# Patient Record
Sex: FEMALE | Race: WHITE
[De-identification: names, ages, dates, MRNs, and addresses within clinical notes are randomized per-mention and may not be internally consistent; named-entity substitution may affect disease eponyms.]

---

## 2017-02-16 ENCOUNTER — HOSPITAL ENCOUNTER (OUTPATIENT)
Dept: HOSPITAL 53 - M RAD | Age: 72
End: 2017-02-16
Attending: NURSE PRACTITIONER
Payer: MEDICARE

## 2017-02-16 DIAGNOSIS — R63.4: ICD-10-CM

## 2017-02-16 DIAGNOSIS — J98.8: Primary | ICD-10-CM

## 2017-02-16 PROCEDURE — 74178 CT ABD&PLV WO CNTR FLWD CNTR: CPT

## 2017-02-16 NOTE — REP
Clinical:  Weight loss.

 

Technique:  Axial contrast enhanced images from the lung bases to the pubic

symphysis using oral and 100 ml Isovue 370 intravenous contrast material with

precontrast and delayed images of the abdomen as well as coronal and sagittal

re-formations.

 

Comparison:  None.

 

Findings:

Lung bases demonstrate diffuse fibrosis and honeycombing without acute

consolidation or obvious nodule/mass.  Visualized heart and pericardium appear

normal.  Small hiatal hernia noted at the gastroesophageal junction.

 

Liver, spleen, pancreas, bilateral adrenal glands and kidneys are relatively

normal.  Few bilateral simple renal cysts measure up to 1.5 cm.  The patient is

status post cholecystectomy.  The enteric system is without obstruction or

obvious acute inflammatory process.  Pelvis demonstrates normal bladder and

age-appropriate uterus/adnexa.  No pelvic fluid or ascites.  No obvious

intraperitoneal or retroperitoneal adenopathy.  No free air.  Atherosclerotic

changes to the thoracic aorta and vasculature noted without aneurysm.

Musculoskeletal structures demonstrate degenerative changes without focal osseous

abnormality.

 

Impression:

Chronic nonacute changes as described above.

No obvious acute intra-abdominal or pelvic pathology appreciated.

Lung bases demonstrate fibrosis and honeycombing.

 

 

Signed by

Johnson Cooper MD 02/16/2017 11:31 A

## 2017-04-05 ENCOUNTER — HOSPITAL ENCOUNTER (OUTPATIENT)
Dept: HOSPITAL 53 - M LAB REF | Age: 72
End: 2017-04-05
Attending: INTERNAL MEDICINE
Payer: MEDICARE

## 2017-04-05 DIAGNOSIS — J84.10: Primary | ICD-10-CM

## 2017-04-11 LAB
ENA SS-A AB SER-ACNC: <0.2 AI (ref 0–0.9)
ENA SS-B AB SER-ACNC: <0.2 AI (ref 0–0.9)

## 2017-04-25 ENCOUNTER — HOSPITAL ENCOUNTER (OUTPATIENT)
Dept: HOSPITAL 53 - M WHC | Age: 72
End: 2017-04-25
Attending: NURSE PRACTITIONER
Payer: MEDICARE

## 2017-04-25 DIAGNOSIS — N95.0: ICD-10-CM

## 2017-04-25 DIAGNOSIS — N84.0: Primary | ICD-10-CM

## 2017-04-25 NOTE — REP
PELVIC ULTRASOUND:

 

Real-time sonographic evaluation of the pelvis is performed utilizing

transabdominal and endovaginal technique.

 

The bladder measures 4.3 x 2.8 x 7.2 cm. The uterus measures 7.5 x 3.0 x 4.3 cm.

Oval heterogenous polyp or mass in the endometrial cavity measures 1.4 x 0.9 x

1.9 cm.  There is a tiny amount of surrounding fluid.  The right ovary could not

be visualized, nor could the left ovary.  I see no adnexal mass or free fluid.

There is a fibroid posteriorly in the uterus measuring 1.6 cm in diameter.

 

IMPRESSION:

 

Oval mass or polyp in the endometrial cavity measuring 1.4 x 0.6 x 1.9 cm.  Tiny

amount of surrounding fluid is seen.

## 2017-04-27 ENCOUNTER — HOSPITAL ENCOUNTER (OUTPATIENT)
Dept: HOSPITAL 53 - M CARPUL | Age: 72
End: 2017-04-27
Attending: INTERNAL MEDICINE
Payer: MEDICARE

## 2017-04-27 ENCOUNTER — HOSPITAL ENCOUNTER (OUTPATIENT)
Dept: HOSPITAL 53 - M LAB | Age: 72
End: 2017-04-27
Attending: INTERNAL MEDICINE
Payer: MEDICARE

## 2017-04-27 DIAGNOSIS — J84.10: Primary | ICD-10-CM

## 2017-04-27 DIAGNOSIS — I51.9: ICD-10-CM

## 2017-04-27 LAB
BUN SERPL-MCNC: 13 MG/DL (ref 7–18)
CREAT SERPL-MCNC: 0.65 MG/DL (ref 0.55–1.02)
GFR SERPL CREATININE-BSD FRML MDRD: > 60 ML/MIN/{1.73_M2} (ref 39–?)

## 2017-04-27 NOTE — ECHO
DATE OF PROCEDURE: 04/27/2017

 

REFERRING PHYSICIAN: Scott Modi MD

 

INDICATION: Shortness of breath, scleroderma and pulmonary hypertension.

 

PATIENT LOCATION: Outpatient

 

HEIGHT: 160 cm

WEIGHT: 51 kg

 

DIMENSIONS:

IVS: 1.1

LV: 3.2

LVPW: 1,1

LA: 2.7

Aorta: 3.1

 

FINDINGS: Study is of acceptable technical quality.  Left ventricle is normal

size and grossly normal systolic function, estimated ejection fraction (EF)

around 50-55%.  Right ventricle does not appear enlarged.  Both atria appear

normal (left atrial volume index is 15.1 mL per meter squared). All four cardiac

valves were reasonably well seen and appear normal.  No pericardial effusion is

noted.  Inferior vena cava is normal size. Aortic root is normal. Aortic arch 
and

abdominal aorta were not well seen.

 

Doppler interrogation reveals no aortic stenosis or insufficiency.  There is

trace mitral and trace tricuspid insufficiency.  Quality of tricuspid

regurgitation (TR) jet was not sufficiently accurate to estimate pulmonary 
artery

pressure.  Pulmonic valve is functionally competent.

 

Evaluation of diastolic function reveals grade 1 diastolic dysfunction (E

velocity on mitral valve inflow is 55.3 cm/s, E prime velocity septal 5.9 and

lateral 5.5 cm/s respectively).

 

CONCLUSIONS:

 

1.  Study is of acceptable technical quality.

2.  Low normal left ventricle (LV) size and systolic function, grade 1 diastolic

dysfunction.

3.  No significant valvular disease.

4.  Normal central venous pressure.

5.  Unable to reliably estimate pulmonary artery pressure.  No indirect signs to

suggest pulmonary hypertension.

 

COMMENTS:

Subacute bacterial endocarditis (SBE) prophylaxis is not recommended.

[facility]

## 2017-05-03 ENCOUNTER — HOSPITAL ENCOUNTER (OUTPATIENT)
Dept: HOSPITAL 53 - M RAD | Age: 72
End: 2017-05-03
Attending: INTERNAL MEDICINE
Payer: MEDICARE

## 2017-05-03 DIAGNOSIS — J84.10: Primary | ICD-10-CM

## 2017-05-03 DIAGNOSIS — R91.8: ICD-10-CM

## 2017-05-03 PROCEDURE — 71260 CT THORAX DX C+: CPT

## 2017-05-03 NOTE — REP
REASON:  Pulmonary fibrosis.

 

COMPARISON:  10/25/2016, a CT angio chest.

 

CONTRAST UTILIZED:  100 mL Isovue-370.

 

There is bilateral hilar adenopathy representing a change from the prior exam.

There are no pleural or pericardial effusions. There is no change in appearance

of the imaged upper abdomen or imaged osseous structures.

 

Evaluation of the lung fields shows marked emphysematous change with honeycomb

lung and scattered asymmetric septal opacities. This is particular to the lung

bases, but is rather widespread and seen throughout the apical regions as well.

The appearance of this has worsened from the prior exam. The asymmetric opacities

could easily obscure a significant lung nodule.

 

IMPRESSION:

 

Advanced fibrotic changes as described above with newly developed adenopathy.

 

 

Signed by

Ba Umanzor DO 05/03/2017 04:14 P

## 2017-06-07 ENCOUNTER — HOSPITAL ENCOUNTER (OUTPATIENT)
Dept: HOSPITAL 53 - M LAB | Age: 72
End: 2017-06-07
Attending: INTERNAL MEDICINE
Payer: MEDICARE

## 2017-06-07 ENCOUNTER — HOSPITAL ENCOUNTER (OUTPATIENT)
Dept: HOSPITAL 53 - M LAB | Age: 72
End: 2017-06-07
Attending: ANESTHESIOLOGY
Payer: MEDICARE

## 2017-06-07 DIAGNOSIS — F33.9: ICD-10-CM

## 2017-06-07 DIAGNOSIS — I10: ICD-10-CM

## 2017-06-07 DIAGNOSIS — F41.9: ICD-10-CM

## 2017-06-07 DIAGNOSIS — R94.31: ICD-10-CM

## 2017-06-07 DIAGNOSIS — Z01.818: Primary | ICD-10-CM

## 2017-06-07 DIAGNOSIS — J84.10: Primary | ICD-10-CM

## 2017-06-07 LAB
ANION GAP SERPL CALC-SCNC: 8 MEQ/L (ref 8–16)
BUN SERPL-MCNC: 14 MG/DL (ref 7–18)
CALCIUM SERPL-MCNC: 9.6 MG/DL (ref 8.8–10.2)
CHLORIDE SERPL-SCNC: 104 MEQ/L (ref 98–107)
CO2 SERPL-SCNC: 26 MEQ/L (ref 21–32)
CREAT SERPL-MCNC: 0.58 MG/DL (ref 0.55–1.02)
GFR SERPL CREATININE-BSD FRML MDRD: > 60 ML/MIN/{1.73_M2} (ref 39–?)
GLUCOSE SERPL-MCNC: 101 MG/DL (ref 83–110)
POTASSIUM SERPL-SCNC: 4 MEQ/L (ref 3.5–5.1)
SODIUM SERPL-SCNC: 138 MEQ/L (ref 136–145)

## 2017-06-07 NOTE — ECGEPIP
Stationary ECG Study

                              [facility]

                                       

                                       Test Date:    2017

Pat Name:     GONZALO SALAS           Department:   

Patient ID:   [id number]                 Room:         -

Gender:       F                        Technician:   LEI

:          1945               Requested By: Timi MCCLURE

Order Number: [id number]     Reading MD:   Matthias Harmon

                                 Measurements

Intervals                              Axis          

Rate:         88                       P:            80

NC:           160                      QRS:          88

QRSD:         94                       T:            40

QT:           357                                    

QTc:          432                                    

                           Interpretive Statements

SINUS RHYTHM

Delayed anterior R wave progression

Comparison tracing not on file

Electronically Signed On 2017 21:11:24 EDT by Matthias Harmon

## 2017-06-12 ENCOUNTER — HOSPITAL ENCOUNTER (OUTPATIENT)
Dept: HOSPITAL 53 - M SDC | Age: 72
Discharge: HOME | End: 2017-06-12
Attending: OBSTETRICS & GYNECOLOGY
Payer: MEDICARE

## 2017-06-12 VITALS — HEIGHT: 63 IN | WEIGHT: 111 LBS | BODY MASS INDEX: 19.67 KG/M2

## 2017-06-12 VITALS — SYSTOLIC BLOOD PRESSURE: 149 MMHG | DIASTOLIC BLOOD PRESSURE: 79 MMHG

## 2017-06-12 DIAGNOSIS — I10: ICD-10-CM

## 2017-06-12 DIAGNOSIS — F41.9: ICD-10-CM

## 2017-06-12 DIAGNOSIS — F32.9: ICD-10-CM

## 2017-06-12 DIAGNOSIS — K21.9: ICD-10-CM

## 2017-06-12 DIAGNOSIS — N95.0: Primary | ICD-10-CM

## 2017-06-12 DIAGNOSIS — D25.9: ICD-10-CM

## 2017-06-12 DIAGNOSIS — R06.02: ICD-10-CM

## 2017-06-12 DIAGNOSIS — Z78.0: ICD-10-CM

## 2017-06-12 DIAGNOSIS — G43.909: ICD-10-CM

## 2017-06-12 DIAGNOSIS — Z86.79: ICD-10-CM

## 2017-06-12 DIAGNOSIS — Z87.39: ICD-10-CM

## 2017-06-12 DIAGNOSIS — Z87.891: ICD-10-CM

## 2017-06-12 LAB
ERYTHROCYTE [DISTWIDTH] IN BLOOD BY AUTOMATED COUNT: 13.8 % (ref 11.5–14.5)
MCH RBC QN AUTO: 29.2 PG (ref 27–33)
MCHC RBC AUTO-ENTMCNC: 32.5 G/DL (ref 32–36.5)
MCV RBC AUTO: 90 FL (ref 80–96)
PLATELET # BLD AUTO: 507 K/MM3 (ref 150–450)
WBC # BLD AUTO: 11.3 K/MM3 (ref 4–10)

## 2017-06-12 PROCEDURE — 86901 BLOOD TYPING SEROLOGIC RH(D): CPT

## 2017-06-12 PROCEDURE — 86900 BLOOD TYPING SEROLOGIC ABO: CPT

## 2017-06-12 PROCEDURE — 58558 HYSTEROSCOPY BIOPSY: CPT

## 2017-06-12 PROCEDURE — 85027 COMPLETE CBC AUTOMATED: CPT

## 2017-06-12 PROCEDURE — 36415 COLL VENOUS BLD VENIPUNCTURE: CPT

## 2017-06-12 PROCEDURE — 86850 RBC ANTIBODY SCREEN: CPT

## 2017-06-12 PROCEDURE — 88305 TISSUE EXAM BY PATHOLOGIST: CPT

## 2017-06-13 NOTE — RO
DATE OF PROCEDURE:  06/12/2017

 

PREOPERATIVE DIAGNOSES:

1.  Postmenopausal bleeding.

2.  Intrauterine mass.

 

POSTOPERATIVE DIAGNOSES:

1.  Postmenopausal bleeding.

2.  Intrauterine mass.

 

PROCEDURE PERFORMED: Hysteroscopic MyoSure/removal of intrauterine mass.

 

SURGEON:  Dr. Duane Santamaria.

 

ASSISTANT:  None.

 

ANESTHESIA: General.

 

SPECIMENS TO PATHOLOGY: Intrauterine mass fragments, removed via MyoSure.

 

ESTIMATED BLOOD LOSS:  5 mL.

 

FLUIDS REPLACED:  1 liters lactated ringers.

 

DRAINS:  In and out catheter, 50 mL urine output.

 

FLUID DEFICIT:  800 mL normal saline.

 

COMPLICATIONS: Possible fundal uterine perforation.

 

FINDINGS: Fundal intrauterine mass measuring approximately 1.5 cm in greatest

dimension, pale, white, smooth appearing and at the level of the fundus.  There

was also concern for possible fundal uterine perforation at the end of the

procedure.  See operative dictation for further details of procedure.

 

INDICATION: The patient is a 72-year-old with postmenopausal bleeding.  Pelvic

ultrasound revealed evidence of possible intrauterine mass measuring 1.9 cm in

greatest dimension.

 

PROCEDURE: The patient was counseled, consented on the risks, benefits,

indications and alternatives of the procedure.  Informed consent was obtained.

She was taken to the operating room with an IV running and placed on the

operating table in the dorsal supine position.  General anesthesia was

administered and the airway secured without any difficulty.  She was then placed

in low lithotomy position.  Time-out was performed per protocol.  She was

prepared and draped in normal sterile fashion.  The bladder was drained with an

in-and-out catheter.  Sterile speculum was placed with good visualization of the

cervix.  The anterior lip of the cervix was grasped with single-tooth tenaculum

and downward traction was applied.  The uterus sounded to approximately 6.5 to 7

cm.  The cervix was sequentially dilated with Benjamin dilators up to #15.  The

MyoSure hysteroscope was placed transcervically into the intrauterine cavity.

The fundal mass was easily visualized.  The MyoSure device was placed right up

against this mass and the MyoSure device was activated.  The tissue fragments

were collected in the specimen bag.  The MyoSure device was removed.  After the

MyoSure device was removed, an additional hysteroscope evaluation was performed.

At this point, it was noted that there was difficulty maintaining uterine

distension.  There was possible small/pinpoint fundal uterine perforation that 
was both

visualized and palpated with the uterine sound.  Given this finding but lack of

any significant bleeding, the decision was made to conclude the procedure.  All

the devices were removed from the uterus.  The uterus was inspected at the

cervical os and minimal to no bleeding was noted.  There was only a small 
amount of

drainage of clear fluid/saline from the hysteroscopic procedure.  Additional 

time was spent observing the amount bleeding from the cervical os.  Minimal to 
no

bleeding from the cervical os was noted even with fundal pressure.  The abdomen

was nondistended.  The fluid deficit at this point was 800 mL.  The decision was

made to conclude the procedure.  All instruments were removed from the vagina.

The sponge and instrument counts were correct.  The patient was hemodynamically

stable throughout the entire procedure.  She was transferred to the

postanesthesia care unit  in good and stable condition.  Because of possible

uterine perforation, I will be administering Ancef 1 gram IV in the PACU.  The

patient will be continued to be observed closely during the immediate

postoperative recovery.

JOSSELIN

## 2017-06-14 LAB — A1AT SERPL-MCNC: 204 MG/DL (ref 90–200)

## 2017-08-29 ENCOUNTER — HOSPITAL ENCOUNTER (INPATIENT)
Dept: HOSPITAL 53 - M ED | Age: 72
LOS: 6 days | Discharge: HOME | DRG: 699 | End: 2017-09-04
Attending: HOSPITALIST | Admitting: HOSPITALIST
Payer: MEDICARE

## 2017-08-29 VITALS — BODY MASS INDEX: 18.44 KG/M2 | WEIGHT: 104.06 LBS | HEIGHT: 63 IN

## 2017-08-29 VITALS — DIASTOLIC BLOOD PRESSURE: 68 MMHG | SYSTOLIC BLOOD PRESSURE: 180 MMHG

## 2017-08-29 DIAGNOSIS — Z79.899: ICD-10-CM

## 2017-08-29 DIAGNOSIS — E87.6: ICD-10-CM

## 2017-08-29 DIAGNOSIS — I10: ICD-10-CM

## 2017-08-29 DIAGNOSIS — Z90.49: ICD-10-CM

## 2017-08-29 DIAGNOSIS — I16.0: ICD-10-CM

## 2017-08-29 DIAGNOSIS — N17.9: ICD-10-CM

## 2017-08-29 DIAGNOSIS — Z87.891: ICD-10-CM

## 2017-08-29 DIAGNOSIS — N28.0: Primary | ICD-10-CM

## 2017-08-29 DIAGNOSIS — M34.81: ICD-10-CM

## 2017-08-29 DIAGNOSIS — Z79.52: ICD-10-CM

## 2017-08-29 DIAGNOSIS — G43.909: ICD-10-CM

## 2017-08-29 DIAGNOSIS — J44.9: ICD-10-CM

## 2017-08-29 DIAGNOSIS — E78.5: ICD-10-CM

## 2017-08-29 DIAGNOSIS — F41.9: ICD-10-CM

## 2017-08-29 LAB
ANION GAP SERPL CALC-SCNC: 11 MEQ/L (ref 8–16)
ANION GAP SERPL CALC-SCNC: 12 MEQ/L (ref 8–16)
BASOPHILS # BLD AUTO: 0 K/MM3 (ref 0–0.2)
BASOPHILS NFR BLD AUTO: 0.1 % (ref 0–1)
BUN SERPL-MCNC: 40 MG/DL (ref 7–18)
BUN SERPL-MCNC: 44 MG/DL (ref 7–18)
CALCIUM SERPL-MCNC: 8.2 MG/DL (ref 8.8–10.2)
CALCIUM SERPL-MCNC: 8.7 MG/DL (ref 8.8–10.2)
CHLORIDE SERPL-SCNC: 105 MEQ/L (ref 98–107)
CHLORIDE SERPL-SCNC: 109 MEQ/L (ref 98–107)
CO2 SERPL-SCNC: 23 MEQ/L (ref 21–32)
CO2 SERPL-SCNC: 24 MEQ/L (ref 21–32)
CREAT SERPL-MCNC: 1.75 MG/DL (ref 0.55–1.02)
CREAT SERPL-MCNC: 1.88 MG/DL (ref 0.55–1.02)
EOSINOPHIL # BLD AUTO: 0.1 K/MM3 (ref 0–0.5)
EOSINOPHIL NFR BLD AUTO: 0.4 % (ref 0–3)
ERYTHROCYTE [DISTWIDTH] IN BLOOD BY AUTOMATED COUNT: 16.4 % (ref 11.5–14.5)
GFR SERPL CREATININE-BSD FRML MDRD: 27.9 ML/MIN/{1.73_M2} (ref 39–?)
GFR SERPL CREATININE-BSD FRML MDRD: 30.3 ML/MIN/{1.73_M2} (ref 39–?)
GLUCOSE SERPL-MCNC: 101 MG/DL (ref 83–110)
GLUCOSE SERPL-MCNC: 107 MG/DL (ref 83–110)
LARGE UNSTAINED CELL #: 0.1 K/MM3 (ref 0–0.4)
LARGE UNSTAINED CELL %: 0.5 % (ref 0–4)
LYMPHOCYTES # BLD AUTO: 0.8 K/MM3 (ref 1.5–4.5)
LYMPHOCYTES NFR BLD AUTO: 4.4 % (ref 24–44)
MAGNESIUM SERPL-MCNC: 2 MG/DL (ref 1.8–2.4)
MCH RBC QN AUTO: 29.7 PG (ref 27–33)
MCHC RBC AUTO-ENTMCNC: 33.1 G/DL (ref 32–36.5)
MCV RBC AUTO: 89.7 FL (ref 80–96)
MONOCYTES # BLD AUTO: 0.7 K/MM3 (ref 0–0.8)
MONOCYTES NFR BLD AUTO: 4.3 % (ref 0–5)
NEUTROPHILS # BLD AUTO: 14.7 K/MM3 (ref 1.8–7.7)
NEUTROPHILS NFR BLD AUTO: 90.2 % (ref 36–66)
OSMOLALITY UR: 343 MOSM/KG (ref 500–800)
PLATELET # BLD AUTO: 176 K/MM3 (ref 150–450)
POTASSIUM SERPL-SCNC: 3.5 MEQ/L (ref 3.5–5.1)
POTASSIUM SERPL-SCNC: 3.5 MEQ/L (ref 3.5–5.1)
SODIUM SERPL-SCNC: 141 MEQ/L (ref 136–145)
SODIUM SERPL-SCNC: 143 MEQ/L (ref 136–145)
WBC # BLD AUTO: 16.3 K/MM3 (ref 4–10)

## 2017-08-29 RX ADMIN — SODIUM CHLORIDE SCH UNITS: 4.5 INJECTION, SOLUTION INTRAVENOUS at 23:31

## 2017-08-30 VITALS — DIASTOLIC BLOOD PRESSURE: 80 MMHG | SYSTOLIC BLOOD PRESSURE: 144 MMHG

## 2017-08-30 VITALS — SYSTOLIC BLOOD PRESSURE: 164 MMHG | DIASTOLIC BLOOD PRESSURE: 72 MMHG

## 2017-08-30 VITALS — SYSTOLIC BLOOD PRESSURE: 134 MMHG | DIASTOLIC BLOOD PRESSURE: 62 MMHG

## 2017-08-30 VITALS — SYSTOLIC BLOOD PRESSURE: 192 MMHG | DIASTOLIC BLOOD PRESSURE: 94 MMHG

## 2017-08-30 VITALS — SYSTOLIC BLOOD PRESSURE: 150 MMHG | DIASTOLIC BLOOD PRESSURE: 70 MMHG

## 2017-08-30 LAB
ALBUMIN SERPL BCG-MCNC: 2.9 GM/DL (ref 3.2–5.2)
ANION GAP SERPL CALC-SCNC: 13 MEQ/L (ref 8–16)
BUN SERPL-MCNC: 40 MG/DL (ref 7–18)
CALCIUM SERPL-MCNC: 8.6 MG/DL (ref 8.8–10.2)
CHLORIDE SERPL-SCNC: 111 MEQ/L (ref 98–107)
CO2 SERPL-SCNC: 21 MEQ/L (ref 21–32)
CREAT SERPL-MCNC: 1.79 MG/DL (ref 0.55–1.02)
ERYTHROCYTE [DISTWIDTH] IN BLOOD BY AUTOMATED COUNT: 16.6 % (ref 11.5–14.5)
FERRITIN SERPL-MCNC: 561 NG/ML (ref 8–252)
FOLATE SERPL-MCNC: 14.8 NG/ML (ref 5.4–?)
GFR SERPL CREATININE-BSD FRML MDRD: 29.6 ML/MIN/{1.73_M2} (ref 39–?)
GLUCOSE SERPL-MCNC: 85 MG/DL (ref 83–110)
IRON SATN MFR SERPL: 37.3 % (ref 13.2–45)
MAGNESIUM SERPL-MCNC: 2 MG/DL (ref 1.8–2.4)
MCH RBC QN AUTO: 29.7 PG (ref 27–33)
MCHC RBC AUTO-ENTMCNC: 33 G/DL (ref 32–36.5)
MCV RBC AUTO: 89.9 FL (ref 80–96)
PHOSPHATE SERPL-MCNC: 3.4 MG/DL (ref 2.5–4.9)
PLATELET # BLD AUTO: 165 K/MM3 (ref 150–450)
POTASSIUM SERPL-SCNC: 3.2 MEQ/L (ref 3.5–5.1)
RETIC HEMOGLOBIN CONTENT CHR: 33.6 PG (ref 24–36)
RETICS/RBC NFR AUTO: 102 X10(9)/L (ref 17–77)
RETICS/RBC NFR: 2.8 % (ref 0.5–1.5)
SODIUM SERPL-SCNC: 145 MEQ/L (ref 136–145)
TIBC SERPL-MCNC: 241 UG/DL (ref 250–450)
VIT B12 SERPL-MCNC: 389 PG/ML (ref 247–911)
WBC # BLD AUTO: 15.2 K/MM3 (ref 4–10)

## 2017-08-30 RX ADMIN — Medication SCH EA: at 14:54

## 2017-08-30 RX ADMIN — SODIUM CHLORIDE SCH UNITS: 4.5 INJECTION, SOLUTION INTRAVENOUS at 20:22

## 2017-08-30 RX ADMIN — Medication SCH EA: at 14:53

## 2017-08-30 RX ADMIN — TOPIRAMATE SCH MG: 100 TABLET, FILM COATED ORAL at 09:42

## 2017-08-30 RX ADMIN — SODIUM CHLORIDE SCH UNITS: 4.5 INJECTION, SOLUTION INTRAVENOUS at 09:43

## 2017-08-30 RX ADMIN — ESCITALOPRAM OXALATE SCH MG: 10 TABLET, FILM COATED ORAL at 09:42

## 2017-08-30 RX ADMIN — Medication SCH EA: at 20:22

## 2017-08-30 RX ADMIN — SPIRONOLACTONE SCH MG: 25 TABLET, FILM COATED ORAL at 09:42

## 2017-08-30 NOTE — REP
CHEST, TWO VIEWS:

 

HISTORY:  Cough.

 

A diffuse increase in interstitial markings is present in the lungs. The cardiac

silhouette is enlarged. The pulmonary vasculature is normal in appearance. The

bony structure is intact.

 

IMPRESSION:

 

There is a diffuse increase in interstitial markings in the lungs. It cannot be

determined if this is acute or chronic as no old films are available for

comparison. This may represent an acute  process such as interstitial edema or

pneumonia, or possibly chronic interstitial fibrosis. Old films would be helpful

for further evaluation.

 

 

Signed by

Devan Cortez MD 08/30/2017 03:35 P

## 2017-08-30 NOTE — REP
URINARY TRACT SONOGRAPHY AND RENAL ARTERY DOPPLER FLOW ASSESSMENT:

 

HISTORY:  Acute kidney injury.  Recent spike to blood pressure.

 

MORPHOLOGIC FINDINGS:  Scanning at the level of the urinary bladder  shows that

it is empty at the time of scanning.  The renal cortical echogenicity pattern is

normal and renal contours are smooth.  Right renal dimensions are 9.7 x 4.3 x 4.6

cm.  The left kidney measures 8.3 x 5.0 x 3.6 cm.  No hydronephrosis is seen on

either side.  There is evidence of mild diffuse cortical atrophy on the left.

There is a 1.7 cm septated cyst in the lower pole of the right kidney and other

smaller cysts are seen.  There is a 7 mm cyst in the lower pole laterally of the

left kidney.  No mass lesion is seen.

 

IMPRESSION: Small somewhat atrophic kidneys without hydronephrosis.  Small

cysts.

 

RENAL ARTERY DOPPLER FLOW ASSESSMENT:  Peak systolic flow velocity in the

abdominal aorta at the level of the main renal arteries is normal at 102.2 cm/s.

Exam quality is inhibited by patient's inability to breath hold.  Peak systolic

flow velocity appears to be normal in the main renal arteries however recorded at

78 cm/s on the right and 74 cm/s on the left.  Renal to aortic flow velocity

ratios are therefore normal at 0.8 on the right and 0.7 on the left.  Resistive

indices and acceleration times are measured in the intralobar arteries of the

upper, mid, and lower pole of each kidney and these values are normal

bilaterally.

 

IMPRESSION: No renal Doppler evidence to suggest renal artery stenosis.

 

 

Signed by

Dano Powell MD 08/30/2017 01:40 P

## 2017-08-30 NOTE — HPE
DATE OF ADMISSION:  08/29/2017

 

PRIMARY CARE PROVIDER: Zee Stephenson

 

RHEUMATOLOGIST: Dr. Modi, phone number [phone].

 

NEPHROLOGIST: Dr. Stone

 

CHIEF COMPLAINT: Hypertension.

 

HISTORY OF PRESENT ILLNESS: This is a 72-year-old female patient with underlying

medical history of systemic scleroderma with pulmonary involvement, history of

anxiety, dyslipidemia, migraine headache, breast lumps, lower back pain, smoking

(quit age 46), chronic obstructive pulmonary disease (COPD). Patient was sent in

by patient's rheumatologist, because patient has been having elevated blood

pressure. Prescribed lisinopril with minimal improvement and for concerns of

scleroderma and renal crisis. Patient's rheumatologist had sent patient to the

hospital to be evaluated. Subsequently request was made for patient to be

admitted given elevated creatinine. Patient reported diarrhea five times today.

Chronic difficulty swallowing but still able to pee. Denies any headache, vision

change, chest pain, pressure, or discomfort. Case discussed with patient's

rheumatologist, who recommended angiotensin-converting enzyme (ACE) inhibitors

and intravenous (IV) fluids for treatment and nephrology consultation.

 

ALLERGIES: No known drug allergies.

 

PAST MEDICAL HISTORY:

1.  Scleroderma.

2.  Anxiety.

3.  Dyslipidemia.

4.  Migraine.

5.  Breast lump.

6.  Chronic back pain.

7.  History of smoking.

8.  COPD.

 

PAST SURGICAL HISTORY:

1.  Cholecystectomy.

2.  Tonsillectomy.

3.  Wrist ganglion cyst.

4.  Right breast biopsy.

5.  Eye surgery.

6.  Right bunionectomy.

7.  Uterine fibroid removal.

 

FAMILY HISTORY: Father with myocardial infarction (MI), age 57. Mother with

Alzheimer dementia and diabetes.

 

SOCIAL HISTORY: Patient former smoker. Quit smoking 25 years ago. One pack per

day smoking for 25 years. Denies alcohol drinking.

 

REVIEW OF SYSTEMS: Reported mild difficulty swallowing that is chronic for the

patient, diarrhea, and hypertension. All other review of systems  is negative.

 

HOME MEDICATIONS:

- Lexapro 10 mg by mouth daily

- ipratropium nasal spray every 6 hours as needed

- lisinopril 10 mg by mouth daily

- Myfortic 360 mg by mouth twice a day

- Nifediac 30 mg by mouth daily

- prednisone 5 mg by mouth daily

- spironolactone 25 mg by mouth daily

- topiramate 100 mg by mouth daily

- acetaminophen extra strength by mouth at bedtime

- vitamin D 6000 units by mouth weekly

 

PHYSICAL EXAMINATION:

VITAL SIGNS: Temperature 95.9, pulse 104, respirations 16, blood pressure 189/97,

pulse oximetry 98% on room air.

GENERAL: Patient frail, alert and oriented times three in no acute distress.

HEENT: Skin taut. Normocephalic, atraumatic.

PULMONARY: Bilaterally clear to auscultation.

CARDIAC: Regular rate and rhythm. Mild tachycardia.

SKIN: Tight and shiny, thick.

ABDOMEN: Soft, nontender. No costovertebral angle (CVA) tenderness. Positive

bowel sounds.

EXTREMITIES: No clubbing, cyanosis, or edema.

 

LABORATORY DATA:

WBC 16.3, hemoglobin and hematocrit 10.4/31.5, platelets 176.

 

Chemistry: Sodium 143, potassium 3.5, chloride 109, bicarbonate 23, BUN 40,

creatinine 1.75.

 

ASSESSMENT AND PLAN: This is a 72-year-old female patient with underlying medical

history of scleroderma, anxiety, dyslipidemia, migraine headache, chronic

obstructive pulmonary disease (COPD), systemic scleroderma with pulmonary

involvement, hypertension, admitted with hypertension and acute renal failure.

 

1.  Hypertension with acute renal insufficiency. Case discussed with patient's

rheumatologist. Possibility of scleroderma acute renal crisis. Treatment of

choice was ACE inhibitors. Blood pressure control. IV fluids have been ordered.

Nephrology has been consulted. Monitor blood pressure. If blood pressure does not

improve, will start the patient on nitroglycerine drip. Follow with nephrology

recommendation. Patient also on nifedipine and spironolactone.

 

2.  Scleroderma. Continue current medication of Myfortic , prednisone. Case

discussed with patient's rheumatologist.

 

3.  Anxiety. Continue current medication.

 

4.  History of migraine headache. Continue current medication.

 

5.  Acute renal insufficiency, possibly scleroderma acute renal crisis. Continue

ACE inhibitors. Monitor kidney function. Followup ultrasounds. Renal studies as

ordered.

 

6.  Deep vein thrombosis (DVT) prophylaxis. Heparin subcutaneous.

 

DISPOSITION PLANNING: Pending clinical improvement. Nephrology consultation.

## 2017-08-30 NOTE — CR
DATE OF CONSULTATION: 08/30/2017

 

CONSULTATION REPORT FOR: Dr. Fortino Milian

 

CONSULTING PHYSICIAN: Dr. Stone

 

REASON FOR CONSULTATION: Management of acute kidney injury and hypertension in a

patient with history of scleroderma.

 

HISTORY OF PRESENT ILLNESS: Yulissa Bethea is a 72-year-old female with past

medical history of diagnosed case of scleroderma with pulmonary involvement,

history of chronic obstructive pulmonary disease (COPD), and multiple other

comorbidities as mentioned below.  She follows up with rheumatology at [facility]

and recently she was having elevated blood pressures, so she was started on

lisinopril, that did not help with the worsening blood pressures, and according

to laboratory work done at rheumatology office, she was also found to have acute

kidney injury, so the patient was sent to the hospital for further management of

accelerated hypertension and acute kidney injury in the setting of scleroderma

and possibility of scleroderma renal crisis. The patient was discussed with the

on-call hospitalist by me overnight. She was started on captopril. Her blood

pressures on arrival were almost 200 systolic. The patient's creatinine on

arrival was 1.8. According to laboratory review, in our center, her baseline

creatinine was 0.58 in June of 2017. The patient was seen and examined by me

today morning. She was laying in the bed with no apparent distress. Her blood

pressure had come down to systolics of 130s with the use of lisinopril and

captopril.

 

PAST MEDICAL HISTORY: The patient has a past medical history of:

1. Scleroderma.

2. Chronic obstructive pulmonary disease (COPD) secondary to scleroderma

pulmonary involvement.

3. Anxiety.

4. Hyperlipidemia.

5. Migraine headaches.

6. History of chronic back pain.

 

PAST SURGICAL HISTORY: She is status post:

1. Cholecystectomy.

2. History of tonsillectomy.

3. History of wrist ganglion cyst removal.

4. Right breast biopsy.

5. Eye surgery.

6. Right bunionectomy.

7. Uterine fibroid removal.

 

ALLERGIES: No known drug allergies.

 

FAMILY HISTORY: History of diabetes in mother and myocardial infarction (MI) in

the father.

 

SOCIAL HISTORY: The patient is a former smoker. She quit almost 25 years ago. She

denies any alcohol abuse or drug abuse.

 

REVIEW OF SYSTEMS:

CONSTITUTIONAL: She denies any fever, chills, or rigors but she reports weight

loss.

EYES: She denies any blurry vision or double vision.

EARS, NOSE AND THROAT: She denies any ear discharge but she does report some

dysphagia. She denies any reflux.

CARDIOVASCULAR: She denies any chest pain or lower extremity edema.

RESPIRATORY: She reports history of chronic obstructive pulmonary disease (COPD)

and dyspnea on moderate exertion.

GASTROINTESTINAL: She denies any pain abdomen, constipation, or diarrhea but she

does report dysphagia.

GENITOURINARY: She denies any dysuria or hematuria.

MUSCULOSKELETAL: She reports a history of skin tightness over the bilateral upper

and lower extremities.

CENTRAL NERVOUS SYSTEM: She denies any history of stroke or seizures.

PSYCHIATRIC: The patient reports a history of depression and anxiety.

ENDOCRINE: She denies any history of diabetes, hypothyroidism, or

hyperthyroidism.

HEMATOLOGIC/ONCOLOGIC: The patient denies any history of easy bruising or

bleeding tendency.

All other review of systems is negative.

 

PHYSICAL EXAMINATION:

GENERAL: The patient is awake, alert and oriented times three, sitting in the

bed, in no apparent distress.

VITAL SIGNS: Temperature is 99.1 degrees Fahrenheit, blood pressure is 144/80,

pulse is 104, respiratory rate of 18, saturating 93% on room air.

INTAKE AND OUTPUT: Urine output recorded as 200 mL yesterday, 600 mL so far today

since overnight.

HEAD AND NECK EXAMINATION: Extraocular muscles intact. Pupils are equally round

and reactive to light. Mucous membranes are moist.

NECK: Supple. There is no jugular venous distention (JVD).

CARDIOVASCULAR: S1, S2, regular rate. No murmur, rub, or gallop.

RESPIRATORY: Chest is clear to auscultation bilaterally. Bilateral equal air

entry. No rales or rhonchi.

ABDOMEN: Soft, nontender. No organomegaly. No ascites.

MUSCULOSKELETAL: The patient has stiffness of her bilateral upper extremity and

lower extremity skin and she is unable to make a fist because of scleroderma.

There is no cyanosis of the extremities.

CENTRAL NERVOUS SYSTEM: Power is 5/5 in all extremities. No focal neurological

deficit at this time.

PSYCHIATRIC: Normal mood and affect.

LYMPH NODE: No significant cervical, axillary or inguinal lymphadenopathy.

SKIN: Diffuse thickness of the skin of upper extremities and lower extremities.

Otherwise, no rashes or ulceration.

 

LABORATORY REVIEW: CBC showed a WBC of 15.2, hemoglobin is 10, platelets are 165.

 

 

Urinalysis showed 2+ protein, negative nitrite, negative leukocyte esterase.

Random osmolality was 343, random creatinine 31.4, random total protein was 72.8,

random sodium was 97, random potassium was 12.7, random chloride is 92.

 

BMP today morning showed sodium 145, potassium 3.2, chloride 111, bicarbonate is

21, BUN 40, creatinine is 1.79, it was 1.8 yesterday, calcium 8.6, ferritin 561,

albumin 2.9, TSH is 2.5.

 

MICROBIOLOGY: Blood cultures are pending so far.

 

IMAGING STUDIES: Chest x-ray done today showed diffuse increase in the

interstitial markings in the lungs.

 

Renal ultrasound done last night showed no renal Doppler evidence of renal artery

stenosis.

 

CURRENT INPATIENT MEDICATIONS: The patient was on IV normal saline which was

stopped. She is on Tylenol as needed. She has been started on captopril 12.5 mg

by mouth every six hours, Lexapro 10 mg by mouth daily, heparin 5000 units every

12 hours, nifedipine 30 mg by mouth daily, potassium chloride 40 mEq times one

dose was given today. She is on Zofran 4 mg every six hours IV as needed for

nausea and vomiting, prednisone 5 mg daily, spironolactone 25 mg daily, Topamax

100 mg by mouth daily, and Myfortic 360 mg by mouth twice a day.

 

ASSESSMENT: A 72-year-old female with past medical history of scleroderma with

pulmonary involvement, chronic obstructive pulmonary disease (COPD), anxiety,

admitted this time to the hospital with acute kidney injury and hypertensive

urgency.

 

PLAN:

1. Hypertensive urgency. It is most likely related to scleroderma renal crisis.

Lisinopril was stopped yesterday. She has been started on Capoten 12.5 mg by

mouth every six hours. I see some improvement in the blood pressure. If the

systolic blood pressure stays above 140 then captopril dose will be increased to

25 mg every six hours. Okay to continue nifedipine 30 mg by mouth daily as well.

 

2. Acute kidney injury. It is most likely related to scleroderma renal crisis. It

is okay to continue the angiotensin-converting enzyme (ACE) inhibitors in this

acute kidney injury secondary to scleroderma renal crisis. However, the patient

does not need the IV fluid hydration at this time. Continue to encourage oral

hydration. Renal function is stable at this time since yesterday. Creatinine is

1.7. There is no urgent need of hemodialysis treatment at this time.

 

3. Hypokalemia. The patient was given a dose of potassium chloride 40 mEq by

mouth times one dose.

 

4. Scleroderma with systemic sclerosis and pulmonary involvement. The patient

follows up with rheumatology at [facility]. She is currently on prednisone 5 mg by

mouth daily and Myfortic 360 mg by mouth twice a day. I will continue the current

dose of Myfortic at this time. However, if I do not see any improvement of renal

function, I might have to hold the Myfortic which might be contributing to acute

kidney injury.

 

5. History of depression and anxiety. Continue current dose of Lexapro 10 mg by

mouth daily.

 

Thank you for involving us in the care of this patient. We shall be happy to

follow the patient along with you tomorrow morning. Plan of care was discussed

with the on-call hospitalist overnight, Dr. Laura Rodarte, and today morning with Dr. Fortino Milian.

## 2017-08-30 NOTE — IPN
DATE:  08/30/2017

 

72-year-old female seen at bedside.  No overnight issues reported.  She is

ordering breakfast.  She denies chest pain, shortness of breath, nausea,

vomiting, and abdominal pain.  Her blood pressure does appear to be better

controlled this morning than last evening.

 

OBJECTIVE:  Temperature is 97.6, pulse 86 and regular, respiratory rate 18 and

nonlabored, blood pressure (BP) 134/62, and SPO2 is 97% on room air.

General:  The patient appears to be in no acute distress.  She is alert,

pleasant.

HEENT:  Unremarkable.

Lungs:  Clear.

Heart:  Regular rate and rhythm.

Abdomen:  Soft.

Extremities:  No edema or calf tenderness.  She does appear to have hardening and

tightening of patches skin more prominent over the extensor surfaces of the

fingers, elbows, as well as, around her mouth and she is complaining of some cold

intolerance for which we will try to make some adjustments with the temperature

for the room.  Neurologic:  Cranial nerves II-XII grossly intact.

 

LABORATORY DATA:  White count is 15.2 down from 16,000, hemoglobin 10, platelets

are 165,000.  Sodium 145, potassium 3.2, chloride 111, bicarb 21, anion gap 13,

BUN 40, creatinine 1.79, glucose 85.  C-reactive protein 0.75.  TSH 2.570.

 

ASSESSMENT/PLAN:

1.  Difficult to control hypertension, likely related to underlying scleroderma

and renal involvement.  Will continue with current antihypertensives and

appreciate Dr. Stone's input.

2.  Hypokalemia.  Will replete.

3.  Acute versus chronic renal failure with likely some degree of chronic kidney

disease (CKD).  Again, appreciate Dr. Stone's input.  However, according to

our labs in the computer, her creatinine in June was 0.58, which would lead me to

think that this is an acute kidney injury.  At any rate, I will defer this to Dr. Stone for further comment.  Will likely want to avoid nephrotoxic drugs.

4.  Scleroderma with what appears to be renal involvement and hypertension.

Again, appreciate Dr. Stone's input.  Continue with current antihypertensives

and she is currently on prednisone.

5.  Normochromic, normocytic anemia.  She does not appear to have any signs of

acute blood loss.  Will go ahead and do iron and B12 studies as well as a retic

count on her and check stool for guaiac.

6.  Leukocytosis, most likely steroid demargination.  She remains afebrile and no

signs of infection.

7.  History of chronic obstructive pulmonary disease (COPD), which appears to be

stable without exacerbation.

8. Anxiety, stable.

9.  Dyslipidemia, which she can followup outpatient for.

10.  History of smoking, which we encouraged smoking cessation.  She informed me

that she actually quit 25 years ago.

11.  Chronic low back pain, stable.

12.  Deep vein thrombosis (DVT) prophylaxis, subcutaneous heparin.

 

DISPOSITION:  Appreciate Dr. Stone's further input and will see the results of

the renal ultrasound for further comment and she will likely need outpatient

followup with nephrology, which we will establish for her.  Her primary care

provider is Zee Stephenson and her rheumatologist is Dr. Modi in [place]

and apparently she does have a history of scleroderma with pulmonary involvement

prior to this episode.

## 2017-08-31 VITALS — DIASTOLIC BLOOD PRESSURE: 76 MMHG | SYSTOLIC BLOOD PRESSURE: 160 MMHG

## 2017-08-31 VITALS — DIASTOLIC BLOOD PRESSURE: 80 MMHG | SYSTOLIC BLOOD PRESSURE: 164 MMHG

## 2017-08-31 VITALS — DIASTOLIC BLOOD PRESSURE: 70 MMHG | SYSTOLIC BLOOD PRESSURE: 156 MMHG

## 2017-08-31 LAB
ALBUMIN SERPL BCG-MCNC: 2.9 GM/DL (ref 3.2–5.2)
ANION GAP SERPL CALC-SCNC: 11 MEQ/L (ref 8–16)
BUN SERPL-MCNC: 40 MG/DL (ref 7–18)
CALCIUM SERPL-MCNC: 8.9 MG/DL (ref 8.8–10.2)
CHLORIDE SERPL-SCNC: 113 MEQ/L (ref 98–107)
CO2 SERPL-SCNC: 20 MEQ/L (ref 21–32)
CREAT SERPL-MCNC: 2.04 MG/DL (ref 0.55–1.02)
ERYTHROCYTE [DISTWIDTH] IN BLOOD BY AUTOMATED COUNT: 16.6 % (ref 11.5–14.5)
GFR SERPL CREATININE-BSD FRML MDRD: 25.4 ML/MIN/{1.73_M2} (ref 39–?)
GLUCOSE SERPL-MCNC: 94 MG/DL (ref 83–110)
MAGNESIUM SERPL-MCNC: 2 MG/DL (ref 1.8–2.4)
MCH RBC QN AUTO: 30.9 PG (ref 27–33)
MCHC RBC AUTO-ENTMCNC: 33.8 G/DL (ref 32–36.5)
MCV RBC AUTO: 91.5 FL (ref 80–96)
PHOSPHATE SERPL-MCNC: 3 MG/DL (ref 2.5–4.9)
PLATELET # BLD AUTO: 187 K/MM3 (ref 150–450)
POTASSIUM SERPL-SCNC: 4 MEQ/L (ref 3.5–5.1)
SODIUM SERPL-SCNC: 144 MEQ/L (ref 136–145)
WBC # BLD AUTO: 14.3 K/MM3 (ref 4–10)

## 2017-08-31 RX ADMIN — SODIUM CHLORIDE SCH UNITS: 4.5 INJECTION, SOLUTION INTRAVENOUS at 09:37

## 2017-08-31 RX ADMIN — SPIRONOLACTONE SCH MG: 25 TABLET, FILM COATED ORAL at 09:37

## 2017-08-31 RX ADMIN — ESCITALOPRAM OXALATE SCH MG: 10 TABLET, FILM COATED ORAL at 09:40

## 2017-08-31 RX ADMIN — NIFEDIPINE SCH MG: 60 TABLET, FILM COATED, EXTENDED RELEASE ORAL at 09:41

## 2017-08-31 RX ADMIN — SODIUM CHLORIDE SCH UNITS: 4.5 INJECTION, SOLUTION INTRAVENOUS at 20:17

## 2017-08-31 RX ADMIN — Medication SCH EA: at 09:36

## 2017-08-31 RX ADMIN — ACETAMINOPHEN SCH MG: 500 TABLET ORAL at 20:18

## 2017-08-31 RX ADMIN — TOPIRAMATE SCH MG: 100 TABLET, FILM COATED ORAL at 09:37

## 2017-08-31 RX ADMIN — Medication SCH EA: at 20:18

## 2017-08-31 NOTE — ECGEPIP
Stationary ECG Study

                           [facility] - ED

                                       

                                       Test Date:    2017

Pat Name:     GONZALO SALAS           Department:   

Patient ID:   [id number]                 Room:         -

Gender:       F                        Technician:   rn

:          1945               Requested By: James MEDRANO

Order Number: [id number]     Reading MD:   Nell Haro

                                 Measurements

Intervals                              Axis          

Rate:         97                       P:            60

MS:           151                      QRS:          15

QRSD:         88                       T:            22

QT:           344                                    

QTc:          437                                    

                           Interpretive Statements

SINUS RHYTHM

DELAYED R PROGRESSION 

NSTTW ABNORMALITY

INCREASED RATE 17

Electronically Signed On 2017 8:56:42 EDT by Nell Haro

## 2017-08-31 NOTE — IPN
DATE: 08/31/2017

 

72-year-old female seen at bedside.  No overnight issues reported.  No chest

pain.  No nausea, vomiting. No cough.

 

OBJECTIVE

Temperature 98.4, pulse 92, respiratory rate 16, blood pressure 164/80, SpO2 is

96% on room.

GENERAL:  The patient appears to be in no acute distress.  Alert, pleasant.

HEENT:  Unremarkable.

LUNGS:  Clear.

HEART:  Regular rate and rhythm.

ABDOMEN:  Soft.

EXTREMITIES:  No edema.  No calf tenderness.

 

LABORATORY DATA:

White count 14.3, hemoglobin 9.8, platelets 187,000.  Sodium 144, potassium 4.0,

chloride 113, bicarbonate 20, anion gap 11, BUN is 40, creatinine 2.04, glucose

94, magnesium 2.0, albumin 2.9.

 

ASSESSMENT/PLAN

1.  Difficult to control hypertension, likely related to scleroderma and renal

crisis.  Appreciate antihypertensive adjustments and input by Dr. Stone.

 

2.  Hyperkalemia, resolved.

 

3.  Acute versus chronic renal failure, likely some underlying degree of chronic

kidney disease.  Appreciate Dr. Stone's input.  We will avoid nephrotoxic

drugs.

 

4.  Scleroderma with arthritic symptoms.  We will make adjustments in her Tylenol

to match what her home dose is.  She follows with rheumatology in [place].  She

is currently on prednisone 5 mg daily and receives CellCept through her

nephrologist office.

 

5.  Normochromic, normocytic anemia, which appears to be anemia of chronic

disease with elevated ferritin level.

 

6.  Leukocytosis, likely steroid demargination.  She remains afebrile.  No signs

of infection.

 

7.  Chronic obstructive pulmonary disease (COPD), stable without exacerbation.

However, she does use oxygen at night.

 

8.  Anxiety, stable.

 

9.  Dyslipidemia.  Followup as an outpatient.

 

10.  History of smoking.  Encourage smoking cessation.

 

11.  Chronic low back pain, stable.

 

12.  Deep vein thrombosis (DVT) prophylaxis. Subcutaneous heparin.

 

DISPOSITION:  We will defer to Dr. Stone regarding further adjustments in her

blood pressure medications.  She did have an appointment in [place] scheduled

for tomorrow that she has rescheduled for a second opinion.

## 2017-09-01 VITALS — SYSTOLIC BLOOD PRESSURE: 143 MMHG | DIASTOLIC BLOOD PRESSURE: 70 MMHG

## 2017-09-01 VITALS — DIASTOLIC BLOOD PRESSURE: 78 MMHG | SYSTOLIC BLOOD PRESSURE: 170 MMHG

## 2017-09-01 VITALS — SYSTOLIC BLOOD PRESSURE: 174 MMHG | DIASTOLIC BLOOD PRESSURE: 97 MMHG

## 2017-09-01 VITALS — SYSTOLIC BLOOD PRESSURE: 147 MMHG | DIASTOLIC BLOOD PRESSURE: 73 MMHG

## 2017-09-01 VITALS — SYSTOLIC BLOOD PRESSURE: 171 MMHG | DIASTOLIC BLOOD PRESSURE: 83 MMHG

## 2017-09-01 VITALS — SYSTOLIC BLOOD PRESSURE: 136 MMHG | DIASTOLIC BLOOD PRESSURE: 79 MMHG

## 2017-09-01 LAB
ALBUMIN SERPL BCG-MCNC: 2.8 GM/DL (ref 3.2–5.2)
ANION GAP SERPL CALC-SCNC: 9 MEQ/L (ref 8–16)
BUN SERPL-MCNC: 40 MG/DL (ref 7–18)
CALCIUM SERPL-MCNC: 8.3 MG/DL (ref 8.8–10.2)
CHLORIDE SERPL-SCNC: 111 MEQ/L (ref 98–107)
CO2 SERPL-SCNC: 23 MEQ/L (ref 21–32)
CREAT SERPL-MCNC: 2.18 MG/DL (ref 0.55–1.02)
ERYTHROCYTE [DISTWIDTH] IN BLOOD BY AUTOMATED COUNT: 16.6 % (ref 11.5–14.5)
GFR SERPL CREATININE-BSD FRML MDRD: 23.5 ML/MIN/{1.73_M2} (ref 39–?)
GLUCOSE SERPL-MCNC: 95 MG/DL (ref 83–110)
MAGNESIUM SERPL-MCNC: 2.1 MG/DL (ref 1.8–2.4)
MCH RBC QN AUTO: 31 PG (ref 27–33)
MCHC RBC AUTO-ENTMCNC: 33.5 G/DL (ref 32–36.5)
MCV RBC AUTO: 92.7 FL (ref 80–96)
PHOSPHATE SERPL-MCNC: 4.2 MG/DL (ref 2.5–4.9)
PLATELET # BLD AUTO: 181 K/MM3 (ref 150–450)
POTASSIUM SERPL-SCNC: 3.9 MEQ/L (ref 3.5–5.1)
SODIUM SERPL-SCNC: 143 MEQ/L (ref 136–145)
WBC # BLD AUTO: 11.6 K/MM3 (ref 4–10)

## 2017-09-01 RX ADMIN — SODIUM CHLORIDE SCH UNITS: 4.5 INJECTION, SOLUTION INTRAVENOUS at 20:46

## 2017-09-01 RX ADMIN — SPIRONOLACTONE SCH MG: 25 TABLET, FILM COATED ORAL at 09:25

## 2017-09-01 RX ADMIN — ACETAMINOPHEN SCH MG: 500 TABLET ORAL at 09:26

## 2017-09-01 RX ADMIN — Medication SCH EA: at 09:24

## 2017-09-01 RX ADMIN — ESCITALOPRAM OXALATE SCH MG: 10 TABLET, FILM COATED ORAL at 09:25

## 2017-09-01 RX ADMIN — TOPIRAMATE SCH MG: 100 TABLET, FILM COATED ORAL at 09:25

## 2017-09-01 RX ADMIN — SODIUM CHLORIDE, PRESERVATIVE FREE SCH ML: 5 INJECTION INTRAVENOUS at 20:46

## 2017-09-01 RX ADMIN — SODIUM CHLORIDE SCH UNITS: 4.5 INJECTION, SOLUTION INTRAVENOUS at 09:24

## 2017-09-01 RX ADMIN — ACETAMINOPHEN SCH MG: 500 TABLET ORAL at 20:45

## 2017-09-01 RX ADMIN — NIFEDIPINE SCH MG: 60 TABLET, FILM COATED, EXTENDED RELEASE ORAL at 09:24

## 2017-09-01 RX ADMIN — Medication SCH EA: at 20:46

## 2017-09-01 NOTE — IPN
DATE:  09/01/2017

 

SUBJECTIVE:

The patient was seen and examined at the bedside today morning. Last 24-hour

events were noted. The patient's antihypertensive regimen was increased

yesterday; however, overnight her blood pressure was still in 160s to 170s. The

patient was reporting headache this morning. Her renal function also continues to

slowly deteriorate. Creatinine is up to 2.1 now. It was 2.0 yesterday.

 

REVIEW OF SYSTEMS:

The patient denies any fevers, chills, rigors. The patient reports headache. She

denies any nausea, vomiting. She denies any chest pain. She does report mild

baseline shortness of breath because of chronic obstructive pulmonary disease

(COPD) and scleroderma renal involvement. The patient does report mild dysphagia

and regurgitation. She denies any pain abdomen, constipation, or diarrhea. Rest

of review of systems is negative.

 

OBJECTIVE:

VITAL SIGNS: Temperature this morning is 97.7 degrees Fahrenheit, blood pressure

is 170/78, pulse is 93, respiratory rate 18, saturating 98% on room air.

INTAKE AND OUTPUT: Urine output recorded as 500 mL yesterday, 425 mg so far today

since overnight. Weight in the bed scale is 44.2 kg.

 

PHYSICAL EXAMINATION:

GENERAL: The patient is awake, alert, and oriented times three, lying in bed, no

apparent distress.

HEAD/NECK: Extraocular muscles intact. Pupils equal, round, and reactive to

light. Mucous membranes are moist. Neck is supple. There is no jugular venous

distention (JVD).

CARDIOVASCULAR: S1, S2. Regular rate. No murmur, rub, or gallop.

RESPIRATORY: Clear to auscultation bilaterally. Bilateral equal air entry. No

rales or rhonchi.

ABDOMEN: Soft, nontender. No organomegaly. No ascites.

MUSCULOSKELETAL: The patient has stiffness of the bilateral upper extremities and

lower extremities because of thickness of the skin.

CENTRAL NERVOUS SYSTEM (CNS): No focal deficits. Power is 5/5 in bilateral upper

extremities.

PSYCHIATRIC: Normal mood and affect.

SKIN: Diffuse thickness of the skin of upper extremities and lower extremities.

 

LABORATORY DATA:

CBC showed WBC of 11.6, hemoglobin 9.6, platelets are 181. BMP showed sodium 143,

potassium 3.9, chloride 111, bicarbonate 23, BUN 40, creatinine 2.1, it was two

yesterday. GFR is down to 23. Calcium is 8.3. Phosphorus 4.2, magnesium 2.1.

Albumin is 2.8.

 

CURRENT INPATIENT MEDICATIONS:

The patient's medications are all reviewed by me. She continues to be on Capoten

25 mg by mouth every six hours. Her nifedipine dose at this time is 60 mg daily.

There is no other change in the medications today as compared with yesterday.

 

ASSESSMENT:

A 72-year-old female with past medical history of scleroderma with pulmonary

involvement, chronic obstructive pulmonary disease (COPD), anxiety disorder,

admitted this time to the hospital with hypertensive urgency and acute kidney

injury.

 

PLAN:

1. Hypertensive urgency. It is most likely secondary to scleroderma renal crisis.

Patient's Captopril is at 25 mg every six hours. Nifedipine dose was increased to

60 mg daily. The patient is being transferred to progressive care unit (PCU) for

close monitoring and possible intravenous (IV) medication administration if blood

pressure does not come down.

2. Acute kidney injury. It is most likely secondary to scleroderma renal crisis.

Okay to continue the high-dose angiotension-converting enzyme (ACE) inhibitor.

Continue to monitor renal function and daily intake and output daily. There is no

urgent need of hemodialysis at this time.

3. Scleroderma with systemic sclerosis and pulmonary involvement. Continue

current dose of Myfortic 360 mg by mouth twice a day, and prednisone 5 mg by

mouth daily.

4. History of depression and anxiety. Continue current dose of Lexapro 10 mg

daily.

 

The plan of care was discussed with the hospitalist team. The patient is being

transferred to PCU. If blood pressure stays above 160 systolic, the patient will

be started on IV Vasotec.

## 2017-09-01 NOTE — IPN
DATE: 09/01/2017

 

72-year-old female seen at bedside.  No overnight issues reported other than

elevated blood pressure.  She denies headache, lightheadedness, dizziness, blurry

vision, no difficulty with chest pain or shortness of breath.

 

OBJECTIVE:

Temperature is 99.5, pulse 88, respiratory rate 26, /97, SPO2 is 96% on

room air.

General:  The patient appears in no acute distress.  Is alert, oriented.

HEENT:  Unremarkable.

Lungs:  Clear.

Heart:  Regular rhythm.

Abdomen:  Soft.

Extremities:  No edema.  No calf tenderness.

 

LABORATORY DATA:

White count is 11.6, hemoglobin 9.6, platelets 181,000.  Sodium 143, potassium

3.9, chloride 111, bicarb 23, anion gap 9, BUN is 40, creatinine is 2.18, glucose

is 95.

 

ASSESSMENT/PLAN:

1.    Difficult to control hypertension related to scleroderma.  Appreciate Dr. Stone's input.

2.    Hyperkalemia resolved.

3.    Acute on chronic renal failure.  Again I appreciate Dr. Stone's input

concerning the renal crisis she will be transferred to PCU started on IV ACE

inhibitor.

4.    Scleroderma with arthritic symptoms.  Continue on Tylenol.  She is

currently on prednisone 5 mg, CellCept outpatient with the rheumatologist that

she follows in [place].

5.    Anemia of chronic disease with elevated ferritin level.

6.    Leukocytosis likely steroid demargination.  She is afebrile.  No signs of

infection and trending downward.

7.    Chronic obstructive pulmonary artery disease (COPD), stable.

8.    Anxiety, stable.

9.    Dyslipidemia.  Followup outpatient.

10.  Prior history of tobacco use.  Encouraged smoking cessation.

11.  Chronic low back pain, stable.

12.  Deep venous thrombosis (DVT) prophylaxis subcutaneous heparin.

 

DISPOSITION:

As outlined we will transfer her to PCU, place on IV medications to control blood

pressure per nephrology.

## 2017-09-02 VITALS — DIASTOLIC BLOOD PRESSURE: 76 MMHG | SYSTOLIC BLOOD PRESSURE: 159 MMHG

## 2017-09-02 VITALS — SYSTOLIC BLOOD PRESSURE: 132 MMHG | DIASTOLIC BLOOD PRESSURE: 63 MMHG

## 2017-09-02 VITALS — DIASTOLIC BLOOD PRESSURE: 69 MMHG | SYSTOLIC BLOOD PRESSURE: 134 MMHG

## 2017-09-02 VITALS — SYSTOLIC BLOOD PRESSURE: 124 MMHG | DIASTOLIC BLOOD PRESSURE: 60 MMHG

## 2017-09-02 VITALS — DIASTOLIC BLOOD PRESSURE: 57 MMHG | SYSTOLIC BLOOD PRESSURE: 123 MMHG

## 2017-09-02 VITALS — SYSTOLIC BLOOD PRESSURE: 138 MMHG | DIASTOLIC BLOOD PRESSURE: 64 MMHG

## 2017-09-02 VITALS — DIASTOLIC BLOOD PRESSURE: 74 MMHG | SYSTOLIC BLOOD PRESSURE: 139 MMHG

## 2017-09-02 LAB
ALBUMIN SERPL BCG-MCNC: 2.8 GM/DL (ref 3.2–5.2)
ANION GAP SERPL CALC-SCNC: 9 MEQ/L (ref 8–16)
BUN SERPL-MCNC: 45 MG/DL (ref 7–18)
CALCIUM SERPL-MCNC: 8.3 MG/DL (ref 8.8–10.2)
CHLORIDE SERPL-SCNC: 110 MEQ/L (ref 98–107)
CO2 SERPL-SCNC: 24 MEQ/L (ref 21–32)
CREAT SERPL-MCNC: 2.25 MG/DL (ref 0.55–1.02)
ERYTHROCYTE [DISTWIDTH] IN BLOOD BY AUTOMATED COUNT: 16.6 % (ref 11.5–14.5)
GFR SERPL CREATININE-BSD FRML MDRD: 22.7 ML/MIN/{1.73_M2} (ref 39–?)
GLUCOSE SERPL-MCNC: 92 MG/DL (ref 83–110)
MAGNESIUM SERPL-MCNC: 2.2 MG/DL (ref 1.8–2.4)
MCH RBC QN AUTO: 30.4 PG (ref 27–33)
MCHC RBC AUTO-ENTMCNC: 33.1 G/DL (ref 32–36.5)
MCV RBC AUTO: 91.7 FL (ref 80–96)
PHOSPHATE SERPL-MCNC: 4.4 MG/DL (ref 2.5–4.9)
PLATELET # BLD AUTO: 188 K/MM3 (ref 150–450)
POTASSIUM SERPL-SCNC: 4.1 MEQ/L (ref 3.5–5.1)
SODIUM SERPL-SCNC: 143 MEQ/L (ref 136–145)
WBC # BLD AUTO: 9.8 K/MM3 (ref 4–10)

## 2017-09-02 RX ADMIN — ACETAMINOPHEN SCH MG: 500 TABLET ORAL at 20:59

## 2017-09-02 RX ADMIN — SODIUM CHLORIDE, PRESERVATIVE FREE SCH ML: 5 INJECTION INTRAVENOUS at 14:00

## 2017-09-02 RX ADMIN — ESCITALOPRAM OXALATE SCH MG: 10 TABLET, FILM COATED ORAL at 09:28

## 2017-09-02 RX ADMIN — Medication SCH EA: at 09:27

## 2017-09-02 RX ADMIN — ACETAMINOPHEN SCH MG: 500 TABLET ORAL at 09:28

## 2017-09-02 RX ADMIN — LABETALOL HYDROCHLORIDE SCH MG: 100 TABLET, FILM COATED ORAL at 21:04

## 2017-09-02 RX ADMIN — Medication SCH EA: at 21:00

## 2017-09-02 RX ADMIN — LABETALOL HYDROCHLORIDE SCH MG: 100 TABLET, FILM COATED ORAL at 09:27

## 2017-09-02 RX ADMIN — TOPIRAMATE SCH MG: 100 TABLET, FILM COATED ORAL at 09:28

## 2017-09-02 RX ADMIN — OMEPRAZOLE SCH MG: 20 CAPSULE, DELAYED RELEASE ORAL at 12:57

## 2017-09-02 RX ADMIN — SODIUM CHLORIDE SCH UNITS: 4.5 INJECTION, SOLUTION INTRAVENOUS at 09:26

## 2017-09-02 RX ADMIN — SPIRONOLACTONE SCH MG: 25 TABLET, FILM COATED ORAL at 09:28

## 2017-09-02 RX ADMIN — SODIUM CHLORIDE, PRESERVATIVE FREE SCH ML: 5 INJECTION INTRAVENOUS at 21:00

## 2017-09-02 RX ADMIN — NIFEDIPINE SCH MG: 60 TABLET, FILM COATED, EXTENDED RELEASE ORAL at 09:28

## 2017-09-02 RX ADMIN — SODIUM CHLORIDE, PRESERVATIVE FREE SCH ML: 5 INJECTION INTRAVENOUS at 05:53

## 2017-09-02 RX ADMIN — SODIUM CHLORIDE SCH UNITS: 4.5 INJECTION, SOLUTION INTRAVENOUS at 21:00

## 2017-09-02 NOTE — IPN
DATE:  09/02/2017

 

72-year-old seen at bedside resting comfortably.  She was transferred to

progressive care unit (PCU) yesterday for potential IV ACE inhibitor.  She denies

any chest pain, shortness of breath, productive sputum.  She denies

lightheadedness, dizziness, blurry vision or visual changes for that matter.

 

OBJECTIVE:  Temperature 97.8, pulse 95, respiratory rate 26, blood pressure (BP)

165/82, SPO2 97% on room air.

General:  The patient appears to be in no acute distress.  She is alert and

oriented, pleasant.

HEENT:  Unremarkable.

Lungs:  Clear.

Heart:  Regular rate and rhythm.

Abdomen:  Soft.

Extremities:  No edema.  No calf tenderness.

 

LABORATORY DATA:  White count 9.8, hemoglobin 9.0, platelets 188,000.  Sodium

143, potassium 4.1, chloride 110, bicarb 24, anion gap 9, BUN 45, creatinine 2.25

up from 2.18, glucose is 92.  Albumin 2.8.

 

ASSESSMENT/PLAN:

1.  Difficult to control hypertension related to scleroderma and renal crisis.

Appreciate Dr. Stone's input.

2.  Hyperkalemia, resolved.

3.  Acute on chronic renal failure.  Appreciate Dr. Stone's input regarding

the renal crisis.  Her creatinine is slightly worse with arthritic symptoms.

Continue Tylenol, prednisone 5 mg and outpatient Cellcept.  Followup with the

rheumatologist that she follows with in [place].

4.  Anemia of chronic disease and elevated ferritin.  Will follow.

5.  Leukocytosis, this was likely related to steroid demargination.  She is

afebrile.  No signs of infection and her white count is normalized today.

6.  Chronic obstructive pulmonary disease (COPD), stable.

7.  Anxiety, stable.

8.  Dyslipidemia.  Followup outpatient.

9.  Prior history of tobacco use.

10.  Chronic low back pain, stable.

11.  Deep vein thrombosis (DVT) prophylaxis, subcutaneous heparin.

 

DISPOSITION:  The patient continues in the PCU.  No issues seen on telemetry.

Appreciate nephrology's input.

## 2017-09-02 NOTE — IPN
DATE:  09/02/2017

 

SUBJECTIVE:

The patient was seen and examined at the bedside today morning in the intensive

care unit (ICU). She is progressive care unit (PCU) status in the ICU now. She

was transferred yesterday because of accelerated and uncontrolled hypertension.

Capoten dose was increased to 50 mg every eight hours. She was also started on

labetalol 100 mg by mouth twice a day. The patient reports that her blood

pressure is better controlled now, and she reports her headache is improved at

this time.

 

REVIEW OF SYSTEMS:

The patient denies any fevers, chills, rigors. She denies any headaches, nausea,

vomiting, chest pain. She does report baseline shortness of breath. She denies

any pain abdomen, constipation, or diarrhea. Rest of review of systems is

negative.

 

OBJECTIVE:

VITAL SIGNS: Temperature is 98.2 degrees Fahrenheit, blood pressure is 134/69,

pulse is 79, respiratory rate of 18, saturating 97% on room air.

INTAKE AND OUTPUT: Urine output recorded as 675 mL yesterday, 300 mL so far today

since overnight. Weight in the bed scale is 43.9 kg.

 

PHYSICAL EXAMINATION:

GENERAL: The patient is awake, alert, and oriented times three, sitting in the

bed, no apparent distress.

HEAD/NECK: Extraocular muscles intact. Pupils equal, round, and reactive to

light. Mucous membranes are moist. Neck is supple. There is no jugular venous

distention. (JVD).

CARDIOVASCULAR: S1, S2. Regular rate. No murmur, rub, or gallop.

RESPIRATORY: Chest is clear to auscultation bilaterally. Bilateral equal air

entry. No rales or rhonchi.

ABDOMEN: Soft, nontender. No organomegaly. No ascites.

MUSCULOSKELETAL: The patient has stiffness of bilateral upper extremities. She is

unable to make a fist with her hand.

CENTRAL NERVOUS SYSTEM (CNS): No focal neurological deficit. Power is 5/5 in

bilateral upper extremities.

PSYCHIATRIC: Normal mood and affect.

SKIN: Diffuse thickness of the skin of upper extremities and lower extremities

because of scleroderma.

 

LABORATORY DATA:

CBC showed a WBC 9.8, hemoglobin nine, platelets 188. BMP showed sodium 143,

potassium 4.1, chloride 110, bicarbonate 24, BUN 45, creatinine 2.2, calcium 8.3,

phosphorus 4.4, magnesium 2.2, albumin 2.8.

 

CURRENT INPATIENT MEDICATIONS:

The patient's medications are all reviewed by me. She is currently on:

- captopril 50 mg every eight hours

- labetalol 100 mg by mouth twice a day

- nifedipine 60 mg daily

- spironolactone 25 mg daily

 

There is no other change in the medications today as compared with yesterday.

 

ASSESSMENT:

A 72-year-old female with past medical history of scleroderma with pulmonary

involvement, chronic obstructive pulmonary disease (COPD), anxiety disorder,

admitted this time to the hospital with hypertensive urgency and acute kidney

injury.

 

PLAN:

1. Hypertensive urgency:  The patient's medications were adjusted overnight.

Captopril dose was increased to 50 mg every eight hours. Labetalol 100 mg twice a

day was added. She continues to be on nifedipine 60 mg daily. Blood pressures are

better controlled at this time.

2. Acute kidney injury:  It is most likely secondary to scleroderma renal crisis.

Continue the high-dose angiotension-converting enzyme (ACE) inhibitors at this

time. Continue to monitor urine output. Creatinine has been fluctuating around

2.1 to 2.2. No urgent need of hemodialysis at this time.

3. Scleroderma with systemic sclerosis and pulmonary involvement. Continue

current dose of Myfortic 360 mg by mouth twice a day, and prednisone 5 mg daily.

 

4. History of depression and anxiety, is well controlled with Lexapro 10 mg

daily.

 

It is okay to downgrade the patient to medical/surgical unit. Blood pressure is

better controlled at this time.

## 2017-09-03 VITALS — SYSTOLIC BLOOD PRESSURE: 120 MMHG | DIASTOLIC BLOOD PRESSURE: 56 MMHG

## 2017-09-03 VITALS — DIASTOLIC BLOOD PRESSURE: 60 MMHG | SYSTOLIC BLOOD PRESSURE: 148 MMHG

## 2017-09-03 VITALS — DIASTOLIC BLOOD PRESSURE: 58 MMHG | SYSTOLIC BLOOD PRESSURE: 125 MMHG

## 2017-09-03 VITALS — DIASTOLIC BLOOD PRESSURE: 64 MMHG | SYSTOLIC BLOOD PRESSURE: 135 MMHG

## 2017-09-03 VITALS — SYSTOLIC BLOOD PRESSURE: 121 MMHG | DIASTOLIC BLOOD PRESSURE: 57 MMHG

## 2017-09-03 VITALS — DIASTOLIC BLOOD PRESSURE: 61 MMHG | SYSTOLIC BLOOD PRESSURE: 129 MMHG

## 2017-09-03 VITALS — SYSTOLIC BLOOD PRESSURE: 140 MMHG | DIASTOLIC BLOOD PRESSURE: 67 MMHG

## 2017-09-03 VITALS — SYSTOLIC BLOOD PRESSURE: 129 MMHG | DIASTOLIC BLOOD PRESSURE: 64 MMHG

## 2017-09-03 VITALS — SYSTOLIC BLOOD PRESSURE: 122 MMHG | DIASTOLIC BLOOD PRESSURE: 60 MMHG

## 2017-09-03 LAB
ALBUMIN SERPL BCG-MCNC: 2.7 GM/DL (ref 3.2–5.2)
ANION GAP SERPL CALC-SCNC: 9 MEQ/L (ref 8–16)
BUN SERPL-MCNC: 47 MG/DL (ref 7–18)
CALCIUM SERPL-MCNC: 8.3 MG/DL (ref 8.8–10.2)
CHLORIDE SERPL-SCNC: 110 MEQ/L (ref 98–107)
CO2 SERPL-SCNC: 23 MEQ/L (ref 21–32)
CREAT SERPL-MCNC: 2.51 MG/DL (ref 0.55–1.02)
ERYTHROCYTE [DISTWIDTH] IN BLOOD BY AUTOMATED COUNT: 16.6 % (ref 11.5–14.5)
GFR SERPL CREATININE-BSD FRML MDRD: 20 ML/MIN/{1.73_M2} (ref 39–?)
GLUCOSE SERPL-MCNC: 90 MG/DL (ref 83–110)
MAGNESIUM SERPL-MCNC: 2.3 MG/DL (ref 1.8–2.4)
MCH RBC QN AUTO: 30.5 PG (ref 27–33)
MCHC RBC AUTO-ENTMCNC: 32.7 G/DL (ref 32–36.5)
MCV RBC AUTO: 93.3 FL (ref 80–96)
PHOSPHATE SERPL-MCNC: 5 MG/DL (ref 2.5–4.9)
PLATELET # BLD AUTO: 215 K/MM3 (ref 150–450)
POTASSIUM SERPL-SCNC: 4.1 MEQ/L (ref 3.5–5.1)
SODIUM SERPL-SCNC: 142 MEQ/L (ref 136–145)
WBC # BLD AUTO: 10 K/MM3 (ref 4–10)

## 2017-09-03 RX ADMIN — OMEPRAZOLE SCH MG: 20 CAPSULE, DELAYED RELEASE ORAL at 08:29

## 2017-09-03 RX ADMIN — SODIUM CHLORIDE, PRESERVATIVE FREE SCH ML: 5 INJECTION INTRAVENOUS at 14:44

## 2017-09-03 RX ADMIN — SODIUM CHLORIDE, PRESERVATIVE FREE SCH ML: 5 INJECTION INTRAVENOUS at 06:00

## 2017-09-03 RX ADMIN — ESCITALOPRAM OXALATE SCH MG: 10 TABLET, FILM COATED ORAL at 08:29

## 2017-09-03 RX ADMIN — Medication SCH EA: at 20:19

## 2017-09-03 RX ADMIN — NIFEDIPINE SCH MG: 60 TABLET, FILM COATED, EXTENDED RELEASE ORAL at 08:30

## 2017-09-03 RX ADMIN — Medication SCH EA: at 08:28

## 2017-09-03 RX ADMIN — SPIRONOLACTONE SCH MG: 25 TABLET, FILM COATED ORAL at 09:00

## 2017-09-03 RX ADMIN — TOPIRAMATE SCH MG: 100 TABLET, FILM COATED ORAL at 08:30

## 2017-09-03 RX ADMIN — LABETALOL HYDROCHLORIDE SCH MG: 100 TABLET, FILM COATED ORAL at 08:29

## 2017-09-03 RX ADMIN — ACETAMINOPHEN SCH MG: 500 TABLET ORAL at 08:28

## 2017-09-03 RX ADMIN — LABETALOL HYDROCHLORIDE SCH MG: 100 TABLET, FILM COATED ORAL at 09:47

## 2017-09-03 RX ADMIN — LABETALOL HYDROCHLORIDE SCH MG: 100 TABLET, FILM COATED ORAL at 09:00

## 2017-09-03 RX ADMIN — ACETAMINOPHEN SCH MG: 500 TABLET ORAL at 20:19

## 2017-09-03 RX ADMIN — SODIUM CHLORIDE SCH UNITS: 4.5 INJECTION, SOLUTION INTRAVENOUS at 08:30

## 2017-09-03 RX ADMIN — SODIUM CHLORIDE, PRESERVATIVE FREE SCH ML: 5 INJECTION INTRAVENOUS at 21:12

## 2017-09-03 RX ADMIN — LABETALOL HYDROCHLORIDE SCH MG: 100 TABLET, FILM COATED ORAL at 20:19

## 2017-09-03 NOTE — IPN
DATE:  09/03/2017

 

SUBJECTIVE:  A 72-year-old female resting comfortably.  She denies any overnight

issues.  No headache, lightheadedness, dizziness, chest pain, nausea, vomiting.

No shortness of breath.

 

OBJECTIVE:

VITAL SIGNS:  Temperature is 99.5, pulse 87 and regular, respiratory rate 18,

blood pressure 125/58, SPO2 is 98% on room air.

GENERAL:  The patient appears to be in no acute distress.  She is alert,

pleasant.  HEENT:  Unremarkable.

LUNGS:  Clear.

HEART:  Regular rate and rhythm.

ABDOMEN:  Soft.

EXTREMITIES:  No edema.  No calf tenderness.

 

LABORATORY DATA:  White count 10,000, hemoglobin 8.5, and platelets are 215,000.

 

Sodium 142, potassium 4.1, chloride 110, bicarbonate 23, anion gap 9, BUN is 47,

creatinine 2.51, glucose is 90.

 

ASSESSMENT AND PLAN:

1.  Difficult to control hypertension, likely related to renal crisis and

scleroderma.  Appreciate Dr. Stone's input and management regarding

antihypertensives.

 

2.  Elevated creatinine with acute on chronic renal failure.  Again, appreciate

Dr. Stone's input.  We will likely make some further changes and keep her

another 24 hours.

 

3.  Scleroderma with arthritic and end-organ involvement.  She will need followup

with her rheumatologist in [place] and she informs me that she is trying to

schedule a second opinion in [place] sometime in the near future.

 

4.  Anemia of chronic disease, elevated ferritin. We will follow.

 

5.  Leukocytosis with steroid demargination.  No signs of infection.  This has

normalized and we will continue to follow.

 

6.  Chronic obstructive pulmonary disease (COPD), stable.

 

7.  Anxiety, stable.

 

8.  Dyslipidemia.  Followup outpatient.

 

9.  Chronic low back pain, stable.

 

10.  Deep vein thrombosis (DVT) prophylaxis with subcutaneous heparin.

 

DISPOSITION:  She has shown some improvement with control of her blood pressure.

I would like to repeat a renal profile tomorrow morning and if she appears to be

stable, we will plan on discharge and close followup with Dr. Stone

outpatient.

## 2017-09-03 NOTE — IPN
DATE:  09/03/2017

 

SUBJECTIVE:  Patient was seen and examined at the bedside today morning.  Patient

reports that she feels better.  She denies any headache.  Her blood pressure is

very well controlled at this time with the current dose of Captopril, labetalol

and nifedipine; however, there is a deterioration of her renal function.

Creatinine has gone up to 2.5 now.

 

REVIEW OF SYSTEMS:  Patient denies any fever or chills, rigors, headache, nausea,

vomiting.  She denies any chest pain.  She does report some mild dysphagia of

solids because of scleroderma.  He denies any abdominal pain, constipation or

diarrhea.  The rest of the review of systems is negative.

 

OBJECTIVE:

VITAL SIGNS:  Temperature 99.5 degrees Fahrenheit, blood pressure 125/58, pulse

87, respiratory rate 18, saturating 98% on room air.

INTAKE AND OUTPUT:  Urine output recorded is 700 mL yesterday, 450 mL so far

today since overnight.  Weight in the bed scale is 46.4 kg.

 

PHYSICAL EXAMINATION:

GENERAL:  Patient is awake, alert, oriented times three.  Laying in bed, no

apparent distress.

HEAD AND NECK EXAM:  Extraocular muscles intact.  Pupils equally round and

reactive to light.  Mucous membranes are moist.  Neck is supple.  There is no

jugular venous distention (JVD).

CARDIOVASCULAR:  S1, S2.  Regular rate.  No murmurs, rubs or gallops.

RESPIRATORY:  Clear to auscultation bilaterally.  Bilaterally equal air entry.

No rales or rhonchi.

ABDOMEN:  Soft, nontender.  No organomegaly.  No ascites.

MUSCULOSKELETAL:  Patient has stiffness of the bilateral upper extremities.

Otherwise, no clubbing or cyanosis.

CENTRAL NERVOUS SYSTEM:  No focal neurological deficits.  Power is 5/5 in all

extremities.

PSYCHIATRIC:  Normal mood and affect.

SKIN:  Diffuse thickness of the skin of upper and lower extremities because of

scleroderma.

 

LABORATORY REVIEW:

Complete blood count (CBC) showed WBC 10, hemoglobin 8.5, platelets 215.

 

Basic metabolic panel showed sodium 142, potassium 4.1, chloride 110, bicarbonate

23, BUN 47, creatinine 2.5, it was 2.2 yesterday, calcium 8.3, phosphorous 5,

magnesium 2.3.

 

Albumin 2.7.

 

CURRENT INPATIENT MEDICATIONS:  Patient's medications were all reviewed by me.

Her Captopril dose has been decreased to 37.5 mg by mouth every 8 hours.  I have

stopped her spironolactone.  She continues to be on labetalol 100 mg by mouth

twice a day and nifedipine 60 mg by mouth daily.

 

ASSESSMENT:

A 72-year-old female with past medical history of scleroderma with pulmonary

involvement, chronic obstructive pulmonary disease (COPD), anxiety disorder,

admitted this time because of hypertensive urgency and acute kidney injury.

 

PLAN:

1.  Hypertension.  Patient's blood pressure is significantly better with current

regimen of labetalol, nifedipine and Captopril; however, renal function continues

to deteriorate.  I am going to slightly decrease the Captopril dose to 37.5 mg by

mouth every 8 hours.  Continue to monitor the blood pressure.

 

2.  Acute kidney injury.  It is most likely secondary to scleroderma renal

crisis.  Continue the angiotensin-converting enzyme (ACE) inhibitors at this

time, even though the renal function is not improving.  There is no urgent need

of hemodialysis at this time; however, renal function is deteriorating.  The

patient wants to go home.  I explained to the patient that I would monitor her

for one more day and if the renal function stabilizes, she can follow up with the

renal service as outpatient within one week after discharge.

 

3.  Scleroderma with systemic sclerosis and pulmonary involvement.  Continue

current dose of Myfortic 360 mg by mouth twice a day, and prednisone 5 mg by

mouth daily.

 

4.  History of depression and anxiety.  Okay to continue Lexapro 10 mg by mouth

daily.

 

DISPOSITION:  Patient can be downgraded to the medical/surgical unit.  Possible

discharge home if the renal function remains stable for the next 24 hours.  Plan

of care was discussed with the hospitalist team.

## 2017-09-04 VITALS — DIASTOLIC BLOOD PRESSURE: 62 MMHG | SYSTOLIC BLOOD PRESSURE: 137 MMHG

## 2017-09-04 VITALS — DIASTOLIC BLOOD PRESSURE: 60 MMHG | SYSTOLIC BLOOD PRESSURE: 130 MMHG

## 2017-09-04 LAB
ALBUMIN SERPL BCG-MCNC: 2.7 GM/DL (ref 3.2–5.2)
ANION GAP SERPL CALC-SCNC: 10 MEQ/L (ref 8–16)
BUN SERPL-MCNC: 46 MG/DL (ref 7–18)
CALCIUM SERPL-MCNC: 8.2 MG/DL (ref 8.8–10.2)
CHLORIDE SERPL-SCNC: 109 MEQ/L (ref 98–107)
CO2 SERPL-SCNC: 22 MEQ/L (ref 21–32)
CREAT SERPL-MCNC: 2.52 MG/DL (ref 0.55–1.02)
ERYTHROCYTE [DISTWIDTH] IN BLOOD BY AUTOMATED COUNT: 16.6 % (ref 11.5–14.5)
GFR SERPL CREATININE-BSD FRML MDRD: 19.9 ML/MIN/{1.73_M2} (ref 39–?)
GLUCOSE SERPL-MCNC: 90 MG/DL (ref 83–110)
MAGNESIUM SERPL-MCNC: 2.2 MG/DL (ref 1.8–2.4)
MCH RBC QN AUTO: 30.8 PG (ref 27–33)
MCHC RBC AUTO-ENTMCNC: 32.9 G/DL (ref 32–36.5)
MCV RBC AUTO: 93.5 FL (ref 80–96)
PHOSPHATE SERPL-MCNC: 4.7 MG/DL (ref 2.5–4.9)
PLATELET # BLD AUTO: 254 K/MM3 (ref 150–450)
POTASSIUM SERPL-SCNC: 4.3 MEQ/L (ref 3.5–5.1)
SODIUM SERPL-SCNC: 141 MEQ/L (ref 136–145)
WBC # BLD AUTO: 8.9 K/MM3 (ref 4–10)

## 2017-09-04 RX ADMIN — LABETALOL HYDROCHLORIDE SCH MG: 100 TABLET, FILM COATED ORAL at 10:31

## 2017-09-04 RX ADMIN — ACETAMINOPHEN SCH MG: 500 TABLET ORAL at 10:31

## 2017-09-04 RX ADMIN — TOPIRAMATE SCH MG: 100 TABLET, FILM COATED ORAL at 10:31

## 2017-09-04 RX ADMIN — NIFEDIPINE SCH MG: 60 TABLET, FILM COATED, EXTENDED RELEASE ORAL at 10:32

## 2017-09-04 RX ADMIN — OMEPRAZOLE SCH MG: 20 CAPSULE, DELAYED RELEASE ORAL at 10:32

## 2017-09-04 RX ADMIN — SODIUM CHLORIDE, PRESERVATIVE FREE SCH ML: 5 INJECTION INTRAVENOUS at 05:50

## 2017-09-04 RX ADMIN — ESCITALOPRAM OXALATE SCH MG: 10 TABLET, FILM COATED ORAL at 10:32

## 2017-09-04 RX ADMIN — Medication SCH EA: at 10:29

## 2017-09-04 NOTE — IPN
DATE:  09/04/2017

 

72-year-old female seen at bedside resting comfortably.  No overnight issues

overnight.  No headache, lightheaded dizziness blurry vision, tinnitus and no

chest pain, nausea, vomiting, shortness of breath.

 

OBJECTIVE:

Temperature is 94, pulse 82, respiratory rate is 18 nonlabored, blood pressure

130/60, SpO2 is 95% on room air.

General:  The patient appears to be in no acute distress.  She is alert,

pleasant.  HEENT:  Unremarkable.

Lungs:  Clear.

Heart:  Regular rhythm.

Abdomen:  Soft.

Extremities:  No edema or calf tenderness.

 

LABS: White count 8.9, hemoglobin 8.8, platelets are 254,000. Sodium 141,

potassium 4.3, chloride 109, bicarb 22, anion gap 10, BUN is 46, creatinine 2.52,

glucose is 90, phosphorus 4.7.

 

ASSESSMENT/PLAN:

1.  Difficulty to control hypertension, much better controlled now.  However,

this is likely related to renal crisis secondary to scleroderma.  Appreciate

nephrology's assistance.

 

2.  Renal crisis with acute kidney injury.  Appreciate nephrology

 

3.  Elevated creatinine due to acute chronic renal failure.  She does not appear

to be trending down much.  Await nephrology's input.

 

4.  Scleroderma with arthritic  and what appears to be end organ involvement.

She follow up with her rheumatologist in [place].  Continue on prednisone as she

informs me that is scheduled for a second opinion in [place] sometime in the

near future.

 

5.  Anemia of chronic disease, elevated ferritin.  Will follow.

 

6.  Leukocytosis and steroid demargination. No signs of infection.  This has

normalized.  Will continue to follow.

 

7.  Chronic obstructive pulmonary disease (COPD), stable.

 

8.  Anxiety, stable.

 

9.  Dyslipidemia.  She is stable.

 

10.  Deep venous thrombosis (DVT) prophylaxis, subcu heparin.

 

DISPOSITION:  She has shown a remarkable improvement with her blood pressure

control.  However, creatinine has continued to be elevated.  Will wait for

nephrology's evaluation today to determine the next step.

## 2017-09-05 NOTE — IPN
DATE OF SERVICE:  09/04/2017

 

SUBJECTIVE:  The patient is seen at the bedside this morning.  She notes no acute

issues overnight.  She is in good spirits this morning.  Her blood pressure

continued to be well controlled overnight, systolic 130s to 140s and her renal

function has stayed stable in the previous 24 hours.  I discussed discharge plans

with the patient today including home blood pressure monitoring and the need for

followup in the nephrology office by the end of this week.

 

REVIEW OF SYSTEMS:  The patient denies any fevers, chills, headache, nausea,

vomiting, denies any chest pain, palpitations.  She reports a so-so appetite.

She denies any abdominal pain, constipation or diarrhea.  The remainder of the

review of systems is negative.

 

OBJECTIVE:

VITAL SIGNS:  Afebrile 98.4, pulse 82, respiratory rate 18, blood pressure

137/62, pulse oximetry 95% on room air.

INTAKE AND OUTPUT:  Intake 760, output urine 950 mL.  Weight on the bed scale

47.2 kg.

 

PHYSICAL EXAMINATION:

GENERAL:  Patient is in bed.  She is awake, alert and oriented times three in no

apparent distress.

HEAD/NECK EXAM:  Extraocular muscles are intact.  Mucous membranes are moist.

Neck is supple.  There is no jugular venous distention (JVD).

CARDIOVASCULAR:  S1, S2 regular rate.  No peripheral edema.

RESPIRATORY:  Clear to auscultation bilaterally with symmetric air entry.

ABDOMEN:  Soft, nontender.

MUSCULOSKELETAL:  No clubbing or cyanosis.  Decreased muscle mass.

SKIN:  Diffuse thickness of the skin of the upper and lower extremities.

NEUROLOGIC:  No focal deficits.

PSYCHIATRIC:  Appropriate mood and affect.

 

LABORATORIES:  WBC 8.9, hemoglobin 8.8, platelets 254, sodium 141, potassium 4.3,

bicarbonate 22, creatinine 2.5 from 2.5 yesterday, BUN 46.  Corrected calcium

9.3, phosphorous 4.7, magnesium 2.2, glucose 90.

 

INPATIENT MEDICATIONS:  Reviewed by myself.  Patient remains on captopril 37.5 mg

by mouth every 8 hours, labetalol 100 mg by mouth twice a day, and Procardia XL

60 mg by mouth daily.  There is no change in her medications in the previous 24

hours.

 

ASSESSMENT/PLAN:

72-year-old female with past medical history of scleroderma with pulmonary

involvement, chronic obstructive pulmonary disease (COPD) and anxiety disorder,

admitted because of hypertensive urgency with acute kidney injury (HARLAN) secondary

to scleroderma renal crisis.

 

1.  Hypertension.  Patient's blood pressure remains very stable, systolic mostly

in the 130s on her current regimen of labetalol 100 mg twice a day, nifedipine 60

mg daily, and captopril 37.5 mg every 8 hours.  Patient will be discharged today

on the same antihypertensive medication regimen.  She is instructed to call the

nephrology office for systolic greater than 160 or less than 110.  She is going

to be seen in the office on Friday, September 8 with repeat blood work.

 

2.  Acute kidney injury secondary to scleroderma renal crisis.  Her captopril

dose was decreased yesterday and she will continue on the same.  She will need

close outpatient followup in the office for monitoring of her renal parameters

and titration of her antihypertensives.  Her electrolytes and volume status are

otherwise stable.

 

3.  Scleroderma with systemic sclerosis and pulmonary involvement.  She continues

on prednisone and Myfortic.

 

DISCHARGE PLANNING:  Discussed with Dr. Milian.  Patient is okay for discharge

today.  I reviewed her home antihypertensive regimen with her and she will be

seen in the office on Friday with repeat blood work.

## 2017-09-11 NOTE — IPN
DATE:  08/31/2017



SUBJECTIVE:  Patient was seen and examined at the bedside today morning. 
Patient continued to be hypertensive yesterday, but she denies any headache, 
nausea,  vomiting. Her captopril dose was increased to 25 mg every 6 hours. 
There is slight worsening of the renal function today. Creatinine went up from 
1.79 to 2.04 today  morning. 



REVIEW OF SYSTEMS:  Patient denies any fever, chills, rigors, headache, nausea,
  vomiting. She does report difficulty with swallowing, and she also reports 
some shortness of breath secondary to scleroderma pulmonary involvement. 
Patient denies any pain in abdomen,  constipation,  or diarrhea.  She denies 
any dysuria, hematuria. Rest of review of systems is negative.  



OBJECTIVE: Vital signs: Temperature is 97.9 degrees Fahrenheit,  blood pressure 
is 156/70, pulse is 92, respiratory rate of 17, saturating 97% on room air.  
Intake and output: Urine output recorded is 1400 mL yesterday, 500 mL so far 
today since overnight. Weight in the bed scale is not available today. It was 
45.5 kg.



PHYSICAL EXAMINATION:  

GENERAL: Patient is awake, alert, oriented times three, lying in bed in no 
apparent distress.

HEAD AND NECK: Extraocular muscles intact. Pupils equally round and reactive to 
light. Mucous membranes are moist. Neck is supple. There is no jugular venous 
distention (JVD).

CARDIOVASCULAR: S1, S2, regular rate. No murmur, rub, or gallop.

RESPIRATORY: Patient had mild crepitations at bases bilaterally.  Otherwise 
bilateral equal air entry. ABDOMEN: Soft, nontender. No organomegaly.  No 
ascites. 

MUSCULOSKELETAL: Patient has stiffness of the bilateral upper and lower 
extremities because of skin thickness.

CENTRAL NERVOUS SYSTEM: No focal neurologic deficit. Power is 5/5 in all 
extremities. 

PSYCHIATRIC: Normal mood and affect. 

SKIN: Patient has diffuse thickness and tightness of the skin of upper and 
lower extremities. 



LABORATORY REVIEW:

CBC showed a WBC of 14.3, hemoglobin 9.8, platelets are 187. 



BMP showed sodium 144, potassium 4, chloride 113, bicarbonate is 20, BUN is 40, 
creatinine is 2.04. Albumin is 2.9. 



Microbiology: Gastrointestinal (GI) panel for stools is negative.  



CURRENT INPATIENT MEDICATIONS: Patient's medications were all reviewed by me. 
Her medications have been changed: Captopril 25 mg by mouth every 6 hours. 
Nifedipine has been increased to 60 mg by mouth daily. She continues to be on 
spironolactone 25 mg daily. There is no other change in the medications today 
as compared with yesterday. 

 

ASSESSMENT:  A 72-year-old female with past medical history of scleroderma with 
pulmonary involvement, chronic obstructive pulmonary disease (COPD) secondary 
to scleroderma lung involvement, anxiety disorder, admitted at this time to 
hospital with hypertensive urgency and acute kidney injury.



RECOMMENDATIONS AND PLAN: 

1.  Hypertensive urgency. This most likely is related to scleroderma renal 
crisis. Patient is currently on captopril, which was increased to 25 mg every 6 
hours. Blood pressures are running in 150s at this time. Continue the current 
dose of captopril. I have also increased the nifedipine dose to 60 mg by mouth 
daily.  Continue to monitor for now. If the blood pressure remains high, then 
patient will need to be transferred to telemetry, and she will need intravenous 
(IV) Vasotec administration.



2.  Acute kidney injury. Patient has mild amount of proteinuria, accelerated 
hypertension, history of scleroderma, most likely scleroderma renal crisis. 
There is no need of renal biopsy at this time.  Continue the captopril 
administration at this time.  There is no need of hemodialysis at this time. 



3.  Scleroderma with systemic sclerosis and pulmonary involvement. I discussed 
the case with the patient's rheumatologist, Dr. Modi at [place], and he 
wants to continue low dose of prednisone 5 mg daily and Myfortic 360 mg by 
mouth twice a day at this time. He agreed with the rest of the management of 
scleroderma renal crisis at this time.



4.  History of depression and anxiety. Continue current dose of Lexapro. 



The plan of care was discussed with the patient at the bedside. 
MTDD

## 2017-10-01 NOTE — DSES
DATE OF ADMISSION:  08/29/2017

DATE OF DISCHARGE:  09/04/2017

 

ADMISSION/DISCHARGE DIAGNOSES:

1. Uncontrolled hypertension.

2.  Acute renal insufficiency related scleroderma.

3. Scleroderma.

4. Anxiety.

5. Migraine headache.

6. Acute renal failure.

7. Dyslipidemia.

8. History of chronic low back pain.

9. History of smoking.

10. Chronic obstructive pulmonary disease (COPD).

 

BRIEF HOSPITAL COURSE:

Ms. Bethea is a 72-year-old female who presents to the emergency department.

Patient of Zee Stephenson. She follows with Dr. Stone as an outpatient, and

her rheumatologist is in [place]. At any rate, presented with elevated blood

pressure, was sent by her rheumatologist to the emergency department for further

information and possible admission for better control of her blood pressure. She

did have complaints temporarily of some difficulty swallowing which is chronic.

She denied any headache, blurry vision, double vision and neurologic issues. Her

blood pressure on admission was noted to be 189/97, and creatinine was 1.75.

 

She was admitted, placed on telemetry, started on angiotension-converting enzyme

(ACE) inhibitors and managed by Dr. Stone, as well as a nitroglycerin drip

overnight. Her blood pressure did come down slowly during the hospital stay. For

further information regarding intake physical, labs, diagnostics, please refer to

the history and physical (H and P) and labs and progress notes.

 

PRIMARY CARE PROVIDER:  Zee Stephenson.

CONSULTANT:  Dr. Stone.

 

CONDITION ON DISCHARGE:  Good.

 

DISPOSITION: Discharge to home.

 

DISCHARGE MEDICATIONS:

- captopril 12.5 mg daily

- labetalol 100 mg twice a day

- nifedipine ER 60 mg daily

- Lexapro 10 mg daily

- ipratropium bromide nasal spray two sprays per nostril every six hours as

needed

- Myfortic 360 mg twice a day

- prednisone 5 mg daily

- spironolactone 25 mg daily

- topiramate 100 mg daily

- Extra-Strength Tylenol one tablet at bedtime

- vitamin D 50,000 units weekly.

 

DISCHARGE INSTRUCTIONS:

Discharge home.  Activity as tolerated. Regular diet.  Followup with nephrology

in a week.  Keep regular appointments primary care provider. Seek attention if

symptoms should worsen. She voices understanding.

## 2017-10-06 ENCOUNTER — HOSPITAL ENCOUNTER (OUTPATIENT)
Dept: HOSPITAL 53 - M LAB REF | Age: 72
End: 2017-10-06
Attending: DERMATOLOGY
Payer: MEDICARE

## 2017-10-06 DIAGNOSIS — M34.9: Primary | ICD-10-CM

## 2018-01-05 ENCOUNTER — HOSPITAL ENCOUNTER (OUTPATIENT)
Dept: HOSPITAL 53 - M RAD | Age: 73
End: 2018-01-05
Attending: INTERNAL MEDICINE
Payer: MEDICARE

## 2018-01-05 DIAGNOSIS — R06.02: Primary | ICD-10-CM

## 2018-01-05 PROCEDURE — 71250 CT THORAX DX C-: CPT

## 2018-01-23 ENCOUNTER — HOSPITAL ENCOUNTER (OUTPATIENT)
Dept: HOSPITAL 53 - M PLARAD | Age: 73
End: 2018-01-23
Attending: INTERNAL MEDICINE
Payer: MEDICARE

## 2018-01-23 DIAGNOSIS — R91.1: Primary | ICD-10-CM

## 2018-01-23 DIAGNOSIS — R06.02: ICD-10-CM

## 2018-01-23 DIAGNOSIS — M35.02: ICD-10-CM

## 2018-01-23 DIAGNOSIS — J84.10: ICD-10-CM

## 2018-01-23 PROCEDURE — 78815 PET IMAGE W/CT SKULL-THIGH: CPT

## 2018-02-15 ENCOUNTER — HOSPITAL ENCOUNTER (OUTPATIENT)
Dept: HOSPITAL 53 - M SMT | Age: 73
End: 2018-02-15
Attending: NURSE PRACTITIONER
Payer: MEDICARE

## 2018-02-15 DIAGNOSIS — N13.30: Primary | ICD-10-CM

## 2018-02-15 DIAGNOSIS — Z79.899: ICD-10-CM

## 2018-02-15 DIAGNOSIS — Z79.82: ICD-10-CM

## 2018-02-15 LAB
APPEARANCE, URINE: CLEAR
BACTERIA UR QL AUTO: NEGATIVE
BILIRUBIN, URINE AUTO: NEGATIVE
BLOOD, URINE BLOOD: NEGATIVE
GLUCOSE, URINE (UA) AUTO: NEGATIVE MG/DL
KETONE, URINE AUTO: NEGATIVE MG/DL
LEUKOCYTE ESTERASE UR QL STRIP.AUTO: NEGATIVE
MUCUS, URINE: (no result)
NITRITE, URINE AUTO: NEGATIVE
PH,URINE: 6 UNITS (ref 5–9)
PROT UR QL STRIP.AUTO: (no result) MG/DL
RBC, URINE AUTO: 1 /HPF (ref 0–3)
SPECIFIC GRAVITY URINE AUTO: 1.01 (ref 1–1.03)
SQUAMOUS #/AREA URNS AUTO: 0 /HPF (ref 0–6)
UROBILINOGEN, URINE AUTO: 0.2 MG/DL (ref 0–2)
WBC, URINE AUTO: 1 /HPF (ref 0–3)

## 2018-02-15 PROCEDURE — 81001 URINALYSIS AUTO W/SCOPE: CPT

## 2018-02-26 ENCOUNTER — HOSPITAL ENCOUNTER (OUTPATIENT)
Dept: HOSPITAL 53 - M RAD | Age: 73
End: 2018-02-26
Attending: NURSE PRACTITIONER
Payer: MEDICARE

## 2018-02-26 DIAGNOSIS — N13.30: Primary | ICD-10-CM

## 2018-03-13 ENCOUNTER — HOSPITAL ENCOUNTER (OUTPATIENT)
Dept: HOSPITAL 53 - M SMT | Age: 73
End: 2018-03-13
Attending: NURSE PRACTITIONER
Payer: MEDICARE

## 2018-03-13 DIAGNOSIS — N13.30: Primary | ICD-10-CM

## 2018-03-13 DIAGNOSIS — Z79.899: ICD-10-CM

## 2018-03-13 LAB
APPEARANCE, URINE: CLEAR
BACTERIA UR QL AUTO: NEGATIVE
BILIRUBIN, URINE AUTO: NEGATIVE
BLOOD, URINE BLOOD: NEGATIVE
GLUCOSE, URINE (UA) AUTO: NEGATIVE MG/DL
KETONE, URINE AUTO: NEGATIVE MG/DL
LEUKOCYTE ESTERASE UR QL STRIP.AUTO: NEGATIVE
NITRITE, URINE AUTO: NEGATIVE
PH,URINE: 6 UNITS (ref 5–9)
PROT UR QL STRIP.AUTO: (no result) MG/DL
RBC, URINE AUTO: 0 /HPF (ref 0–3)
SPECIFIC GRAVITY URINE AUTO: 1.01 (ref 1–1.03)
SQUAMOUS #/AREA URNS AUTO: 0 /HPF (ref 0–6)
UROBILINOGEN, URINE AUTO: 0.2 MG/DL (ref 0–2)
WBC, URINE AUTO: 1 /HPF (ref 0–3)

## 2018-03-13 PROCEDURE — 81001 URINALYSIS AUTO W/SCOPE: CPT

## 2018-03-16 ENCOUNTER — HOSPITAL ENCOUNTER (OUTPATIENT)
Dept: HOSPITAL 53 - M RAD | Age: 73
End: 2018-03-16
Attending: NURSE PRACTITIONER
Payer: MEDICARE

## 2018-03-16 DIAGNOSIS — I25.10: ICD-10-CM

## 2018-03-16 DIAGNOSIS — N13.30: Primary | ICD-10-CM

## 2018-03-16 DIAGNOSIS — J43.9: ICD-10-CM

## 2018-03-16 PROCEDURE — 74176 CT ABD & PELVIS W/O CONTRAST: CPT

## 2018-04-05 ENCOUNTER — HOSPITAL ENCOUNTER (OUTPATIENT)
Dept: HOSPITAL 53 - M SMT | Age: 73
End: 2018-04-05
Attending: UROLOGY
Payer: MEDICARE

## 2018-04-05 DIAGNOSIS — N13.30: ICD-10-CM

## 2018-04-05 DIAGNOSIS — Z01.818: Primary | ICD-10-CM

## 2018-04-05 LAB
ANION GAP: 8 MEQ/L (ref 8–16)
APPEARANCE, URINE: CLEAR
BACTERIA UR QL AUTO: NEGATIVE
BILIRUBIN, URINE AUTO: NEGATIVE
BLOOD UREA NITROGEN: 44 MG/DL (ref 7–18)
BLOOD, URINE BLOOD: NEGATIVE
CALCIUM LEVEL: 9.1 MG/DL (ref 8.8–10.2)
CARBON DIOXIDE LEVEL: 26 MEQ/L (ref 21–32)
CHLORIDE LEVEL: 111 MEQ/L (ref 98–107)
CREATININE FOR GFR: 1.64 MG/DL (ref 0.55–1.3)
GFR SERPL CREATININE-BSD FRML MDRD: 32.6 ML/MIN/{1.73_M2} (ref 39–?)
GLUCOSE, FASTING: 90 MG/DL (ref 70–100)
GLUCOSE, URINE (UA) AUTO: NEGATIVE MG/DL
HEMATOCRIT: 36.3 % (ref 36–47)
HEMOGLOBIN: 11.3 G/DL (ref 12–15.5)
INR: 1
KETONE, URINE AUTO: NEGATIVE MG/DL
LEUKOCYTE ESTERASE UR QL STRIP.AUTO: NEGATIVE
MEAN CORPUSCULAR HEMOGLOBIN: 28.4 PG (ref 27–33)
MEAN CORPUSCULAR HGB CONC: 31.1 G/DL (ref 32–36.5)
MEAN CORPUSCULAR VOLUME: 91.2 FL (ref 80–96)
MUCUS, URINE: (no result)
NITRITE, URINE AUTO: NEGATIVE
NRBC BLD AUTO-RTO: 0 % (ref 0–0)
PARTIAL THROMBOPLASTIN TIME: 28.6 SECONDS (ref 26.8–37.9)
PH,URINE: 5 UNITS (ref 5–9)
PLATELET COUNT, AUTOMATED: 390 10^3/UL (ref 150–450)
POTASSIUM SERUM: 4.3 MEQ/L (ref 3.5–5.1)
PROT UR QL STRIP.AUTO: (no result) MG/DL
PROTHROMBIN TIME: 13.3 SECONDS (ref 12.4–14.5)
RBC, URINE AUTO: 2 /HPF (ref 0–3)
RED BLOOD COUNT: 3.98 10^6/UL (ref 4–5.4)
RED CELL DISTRIBUTION WIDTH: 14.2 % (ref 11.5–14.5)
SODIUM LEVEL: 145 MEQ/L (ref 136–145)
SPECIFIC GRAVITY URINE AUTO: 1.01 (ref 1–1.03)
SQUAMOUS #/AREA URNS AUTO: 0 /HPF (ref 0–6)
UROBILINOGEN, URINE AUTO: 0.2 MG/DL (ref 0–2)
WBC, URINE AUTO: 1 /HPF (ref 0–3)
WHITE BLOOD COUNT: 13.5 10^3/UL (ref 4–10)

## 2018-04-05 PROCEDURE — 80048 BASIC METABOLIC PNL TOTAL CA: CPT

## 2018-04-17 ENCOUNTER — HOSPITAL ENCOUNTER (OUTPATIENT)
Dept: HOSPITAL 53 - M SDC | Age: 73
LOS: 1 days | Discharge: HOME | End: 2018-04-18
Attending: UROLOGY
Payer: MEDICARE

## 2018-04-17 DIAGNOSIS — M54.5: ICD-10-CM

## 2018-04-17 DIAGNOSIS — K44.9: ICD-10-CM

## 2018-04-17 DIAGNOSIS — N13.0: Primary | ICD-10-CM

## 2018-04-17 DIAGNOSIS — J44.9: ICD-10-CM

## 2018-04-17 DIAGNOSIS — Z79.899: ICD-10-CM

## 2018-04-17 DIAGNOSIS — Z79.52: ICD-10-CM

## 2018-04-17 DIAGNOSIS — M34.81: ICD-10-CM

## 2018-04-17 DIAGNOSIS — Z79.82: ICD-10-CM

## 2018-04-17 DIAGNOSIS — J84.10: ICD-10-CM

## 2018-04-17 DIAGNOSIS — Z87.891: ICD-10-CM

## 2018-04-17 DIAGNOSIS — I10: ICD-10-CM

## 2018-04-17 PROCEDURE — 52332 CYSTOSCOPY AND TREATMENT: CPT

## 2018-04-17 RX ADMIN — LABETALOL HYDROCHLORIDE 1 MG: 5 INJECTION INTRAVENOUS at 12:53

## 2018-04-17 RX ADMIN — SODIUM CHLORIDE, POTASSIUM CHLORIDE, SODIUM LACTATE AND CALCIUM CHLORIDE 1 MLS/HR: 600; 310; 30; 20 INJECTION, SOLUTION INTRAVENOUS at 08:25

## 2018-04-17 RX ADMIN — LABETALOL HYDROCHLORIDE 1 MG: 5 INJECTION INTRAVENOUS at 12:58

## 2018-04-17 RX ADMIN — LABETALOL HYDROCHLORIDE 1 MG: 5 INJECTION INTRAVENOUS at 13:10

## 2018-04-17 RX ADMIN — IOTHALAMATE MEGLUMINE 1 ML: 600 INJECTION INTRAVASCULAR at 11:49

## 2018-04-27 ENCOUNTER — HOSPITAL ENCOUNTER (OUTPATIENT)
Dept: HOSPITAL 53 - M SMT | Age: 73
End: 2018-04-27
Attending: NURSE PRACTITIONER
Payer: MEDICARE

## 2018-04-27 DIAGNOSIS — Z79.01: ICD-10-CM

## 2018-04-27 DIAGNOSIS — Z01.812: Primary | ICD-10-CM

## 2018-04-27 LAB
ANION GAP: 7 MEQ/L (ref 8–16)
BLOOD UREA NITROGEN: 36 MG/DL (ref 7–18)
CALCIUM LEVEL: 8.6 MG/DL (ref 8.8–10.2)
CARBON DIOXIDE LEVEL: 25 MEQ/L (ref 21–32)
CHLORIDE LEVEL: 108 MEQ/L (ref 98–107)
CREATININE FOR GFR: 1.94 MG/DL (ref 0.55–1.3)
GFR SERPL CREATININE-BSD FRML MDRD: 26.9 ML/MIN/{1.73_M2} (ref 39–?)
GLUCOSE, FASTING: 119 MG/DL (ref 70–100)
HEMATOCRIT: 35.3 % (ref 36–47)
HEMOGLOBIN: 11.1 G/DL (ref 12–15.5)
INR: 1
MEAN CORPUSCULAR HEMOGLOBIN: 28.9 PG (ref 27–33)
MEAN CORPUSCULAR HGB CONC: 31.4 G/DL (ref 32–36.5)
MEAN CORPUSCULAR VOLUME: 91.9 FL (ref 80–96)
NRBC BLD AUTO-RTO: 0 % (ref 0–0)
PARTIAL THROMBOPLASTIN TIME: 30.3 SECONDS (ref 26.8–37.9)
PLATELET COUNT, AUTOMATED: 435 10^3/UL (ref 150–450)
POTASSIUM SERUM: 5.1 MEQ/L (ref 3.5–5.1)
PROTHROMBIN TIME: 13.3 SECONDS (ref 12.4–14.5)
RED BLOOD COUNT: 3.84 10^6/UL (ref 4–5.4)
RED CELL DISTRIBUTION WIDTH: 14.1 % (ref 11.5–14.5)
SODIUM LEVEL: 140 MEQ/L (ref 136–145)
WHITE BLOOD COUNT: 10.7 10^3/UL (ref 4–10)

## 2018-04-27 PROCEDURE — 80048 BASIC METABOLIC PNL TOTAL CA: CPT

## 2018-05-02 ENCOUNTER — HOSPITAL ENCOUNTER (OUTPATIENT)
Dept: HOSPITAL 53 - M SMT | Age: 73
End: 2018-05-02
Attending: NURSE PRACTITIONER
Payer: MEDICARE

## 2018-05-02 DIAGNOSIS — Z79.899: ICD-10-CM

## 2018-05-02 DIAGNOSIS — Z01.812: Primary | ICD-10-CM

## 2018-05-02 LAB
APPEARANCE, URINE: (no result)
BACTERIA UR QL AUTO: NEGATIVE
BILIRUBIN, URINE AUTO: NEGATIVE
BLOOD, URINE BLOOD: (no result)
GLUCOSE, URINE (UA) AUTO: NEGATIVE MG/DL
KETONE, URINE AUTO: NEGATIVE MG/DL
LEUKOCYTE ESTERASE UR QL STRIP.AUTO: (no result)
MUCUS, URINE: (no result)
NITRITE, URINE AUTO: NEGATIVE
PH,URINE: 6 UNITS (ref 5–9)
PROT UR QL STRIP.AUTO: (no result) MG/DL
RBC, URINE AUTO: (no result) /HPF (ref 0–3)
SPECIFIC GRAVITY URINE AUTO: 1.01 (ref 1–1.03)
SQUAMOUS #/AREA URNS AUTO: 0 /HPF (ref 0–6)
UROBILINOGEN, URINE AUTO: 0.2 MG/DL (ref 0–2)
WBC, URINE AUTO: 13 /HPF (ref 0–3)

## 2018-05-02 PROCEDURE — 36415 COLL VENOUS BLD VENIPUNCTURE: CPT

## 2018-05-08 ENCOUNTER — HOSPITAL ENCOUNTER (INPATIENT)
Dept: HOSPITAL 53 - M OR | Age: 73
LOS: 6 days | Discharge: HOME | DRG: 699 | End: 2018-05-14
Attending: UROLOGY | Admitting: UROLOGY
Payer: MEDICARE

## 2018-05-08 DIAGNOSIS — I73.00: ICD-10-CM

## 2018-05-08 DIAGNOSIS — Z79.52: ICD-10-CM

## 2018-05-08 DIAGNOSIS — E87.2: ICD-10-CM

## 2018-05-08 DIAGNOSIS — I16.0: ICD-10-CM

## 2018-05-08 DIAGNOSIS — N13.30: ICD-10-CM

## 2018-05-08 DIAGNOSIS — N18.3: ICD-10-CM

## 2018-05-08 DIAGNOSIS — Q62.11: Primary | ICD-10-CM

## 2018-05-08 DIAGNOSIS — K91.89: ICD-10-CM

## 2018-05-08 DIAGNOSIS — I48.91: ICD-10-CM

## 2018-05-08 DIAGNOSIS — I95.9: ICD-10-CM

## 2018-05-08 DIAGNOSIS — E83.42: ICD-10-CM

## 2018-05-08 DIAGNOSIS — Z87.891: ICD-10-CM

## 2018-05-08 DIAGNOSIS — M34.9: ICD-10-CM

## 2018-05-08 DIAGNOSIS — I12.9: ICD-10-CM

## 2018-05-08 DIAGNOSIS — Z79.899: ICD-10-CM

## 2018-05-08 DIAGNOSIS — N17.9: ICD-10-CM

## 2018-05-08 LAB
ANION GAP: 7 MEQ/L (ref 8–16)
BLOOD UREA NITROGEN: 43 MG/DL (ref 7–18)
CALCIUM LEVEL: 8.1 MG/DL (ref 8.8–10.2)
CARBON DIOXIDE LEVEL: 23 MEQ/L (ref 21–32)
CHLORIDE LEVEL: 111 MEQ/L (ref 98–107)
CREATININE FOR GFR: 1.69 MG/DL (ref 0.55–1.3)
GFR SERPL CREATININE-BSD FRML MDRD: 31.5 ML/MIN/{1.73_M2} (ref 39–?)
GLUCOSE, FASTING: 167 MG/DL (ref 70–100)
HEMATOCRIT: 33.2 % (ref 36–47)
HEMOGLOBIN: 10.4 G/DL (ref 12–15.5)
MEAN CORPUSCULAR HEMOGLOBIN: 29.1 PG (ref 27–33)
MEAN CORPUSCULAR HGB CONC: 31.3 G/DL (ref 32–36.5)
MEAN CORPUSCULAR VOLUME: 93 FL (ref 80–96)
NRBC BLD AUTO-RTO: 0 % (ref 0–0)
PLATELET COUNT, AUTOMATED: 279 10^3/UL (ref 150–450)
POTASSIUM SERUM: 4.6 MEQ/L (ref 3.5–5.1)
RED BLOOD COUNT: 3.57 10^6/UL (ref 4–5.4)
RED CELL DISTRIBUTION WIDTH: 13.9 % (ref 11.5–14.5)
SODIUM LEVEL: 141 MEQ/L (ref 136–145)
WHITE BLOOD COUNT: 16 10^3/UL (ref 4–10)

## 2018-05-08 PROCEDURE — 8E0W4CZ ROBOTIC ASSISTED PROCEDURE OF TRUNK REGION, PERCUTANEOUS ENDOSCOPIC APPROACH: ICD-10-PCS

## 2018-05-08 PROCEDURE — 0T768DZ DILATION OF RIGHT URETER WITH INTRALUMINAL DEVICE, VIA NATURAL OR ARTIFICIAL OPENING ENDOSCOPIC: ICD-10-PCS

## 2018-05-08 PROCEDURE — 0TB34ZX EXCISION OF RIGHT KIDNEY PELVIS, PERCUTANEOUS ENDOSCOPIC APPROACH, DIAGNOSTIC: ICD-10-PCS

## 2018-05-08 PROCEDURE — 0TP98DZ REMOVAL OF INTRALUMINAL DEVICE FROM URETER, VIA NATURAL OR ARTIFICIAL OPENING ENDOSCOPIC: ICD-10-PCS

## 2018-05-08 RX ADMIN — FENTANYL CITRATE 1 MCG: 50 INJECTION, SOLUTION INTRAMUSCULAR; INTRAVENOUS at 11:45

## 2018-05-08 RX ADMIN — FENTANYL CITRATE 1 MCG: 50 INJECTION, SOLUTION INTRAMUSCULAR; INTRAVENOUS at 11:50

## 2018-05-08 RX ADMIN — POTASSIUM CHLORIDE, DEXTROSE MONOHYDRATE AND SODIUM CHLORIDE 1 MLS/HR: 150; 5; 450 INJECTION, SOLUTION INTRAVENOUS at 21:40

## 2018-05-08 RX ADMIN — ACETAMINOPHEN 1 MG: 650 TABLET, FILM COATED, EXTENDED RELEASE ORAL at 14:06

## 2018-05-08 RX ADMIN — ACETAMINOPHEN 1 MG: 650 TABLET, FILM COATED, EXTENDED RELEASE ORAL at 21:40

## 2018-05-08 RX ADMIN — MORPHINE SULFATE 1 MG: 4 INJECTION INTRAVENOUS at 20:02

## 2018-05-08 RX ADMIN — SODIUM CHLORIDE, POTASSIUM CHLORIDE, SODIUM LACTATE AND CALCIUM CHLORIDE 1 MLS/HR: 600; 310; 30; 20 INJECTION, SOLUTION INTRAVENOUS at 07:05

## 2018-05-08 RX ADMIN — LABETALOL HYDROCHLORIDE 1 MG: 100 TABLET, FILM COATED ORAL at 21:48

## 2018-05-08 RX ADMIN — DEXTROSE MONOHYDRATE 1 MG: 50 INJECTION, SOLUTION INTRAVENOUS at 14:06

## 2018-05-08 RX ADMIN — FENTANYL CITRATE 1 MCG: 50 INJECTION, SOLUTION INTRAMUSCULAR; INTRAVENOUS at 11:55

## 2018-05-08 RX ADMIN — POTASSIUM CHLORIDE, DEXTROSE MONOHYDRATE AND SODIUM CHLORIDE 1 MLS/HR: 150; 5; 450 INJECTION, SOLUTION INTRAVENOUS at 14:06

## 2018-05-09 LAB
ANION GAP: 5 MEQ/L (ref 8–16)
BLOOD UREA NITROGEN: 40 MG/DL (ref 7–18)
CALCIUM LEVEL: 8.4 MG/DL (ref 8.8–10.2)
CARBON DIOXIDE LEVEL: 24 MEQ/L (ref 21–32)
CHLORIDE LEVEL: 109 MEQ/L (ref 98–107)
CREATININE FOR GFR: 1.97 MG/DL (ref 0.55–1.3)
EST. AVERAGE GLUCOSE BLD GHB EST-MCNC: 100 MG/DL (ref 60–110)
GFR SERPL CREATININE-BSD FRML MDRD: 26.4 ML/MIN/{1.73_M2} (ref 39–?)
GLUCOSE, FASTING: 98 MG/DL (ref 70–100)
HEMATOCRIT: 32 % (ref 36–47)
HEMOGLOBIN: 10.1 G/DL (ref 12–15.5)
MEAN CORPUSCULAR HEMOGLOBIN: 28.9 PG (ref 27–33)
MEAN CORPUSCULAR HGB CONC: 31.6 G/DL (ref 32–36.5)
MEAN CORPUSCULAR VOLUME: 91.4 FL (ref 80–96)
NRBC BLD AUTO-RTO: 0 % (ref 0–0)
PLATELET COUNT, AUTOMATED: 313 10^3/UL (ref 150–450)
POTASSIUM SERUM: 4.8 MEQ/L (ref 3.5–5.1)
RED BLOOD COUNT: 3.5 10^6/UL (ref 4–5.4)
RED CELL DISTRIBUTION WIDTH: 13.8 % (ref 11.5–14.5)
SODIUM LEVEL: 138 MEQ/L (ref 136–145)
WHITE BLOOD COUNT: 15.9 10^3/UL (ref 4–10)

## 2018-05-09 RX ADMIN — LABETALOL HYDROCHLORIDE 1 MG: 100 TABLET, FILM COATED ORAL at 20:39

## 2018-05-09 RX ADMIN — CIPROFLOXACIN 1 MG: 250 TABLET, FILM COATED ORAL at 17:09

## 2018-05-09 RX ADMIN — Medication 1: at 20:39

## 2018-05-09 RX ADMIN — ACETAMINOPHEN 1 MG: 650 TABLET, FILM COATED, EXTENDED RELEASE ORAL at 13:21

## 2018-05-09 RX ADMIN — NIFEDIPINE 1 MG: 60 TABLET, FILM COATED, EXTENDED RELEASE ORAL at 13:20

## 2018-05-09 RX ADMIN — CIPROFLOXACIN 1 MG: 250 TABLET, FILM COATED ORAL at 08:11

## 2018-05-09 RX ADMIN — LABETALOL HYDROCHLORIDE 1 MG: 100 TABLET, FILM COATED ORAL at 08:11

## 2018-05-09 RX ADMIN — DEXTROSE MONOHYDRATE 1 MG: 50 INJECTION, SOLUTION INTRAVENOUS at 11:52

## 2018-05-09 RX ADMIN — MORPHINE SULFATE 1 MG: 4 INJECTION INTRAVENOUS at 01:47

## 2018-05-09 RX ADMIN — ACETAMINOPHEN 1 MG: 650 TABLET, FILM COATED, EXTENDED RELEASE ORAL at 20:38

## 2018-05-09 RX ADMIN — ACETAMINOPHEN 1 MG: 650 TABLET, FILM COATED, EXTENDED RELEASE ORAL at 06:02

## 2018-05-09 RX ADMIN — HYDROMORPHONE HYDROCHLORIDE 1 MG: 1 INJECTION, SOLUTION INTRAMUSCULAR; INTRAVENOUS; SUBCUTANEOUS at 06:01

## 2018-05-09 RX ADMIN — NITROGLYCERIN 1 PATCH: 0.2 PATCH TRANSDERMAL at 08:12

## 2018-05-09 RX ADMIN — MORPHINE SULFATE 1 MG: 4 INJECTION INTRAVENOUS at 00:05

## 2018-05-09 RX ADMIN — HYDROMORPHONE HYDROCHLORIDE 1 MG: 1 INJECTION, SOLUTION INTRAMUSCULAR; INTRAVENOUS; SUBCUTANEOUS at 02:39

## 2018-05-10 LAB
ABG BASE EXCESS: -6.1 (ref -2–2)
ABG HCO3: 18 MEQ/L (ref 22–26)
ABG O2 SATURATION: 96.4 % (ref 95–99)
ABG PARTIAL PRESSURE CO2: 30.2 MMHG (ref 35–45)
ABG PARTIAL PRESSURE O2: 90.4 MMHG (ref 75–100)
ABG PH (ARTERIAL): 7.39 UNITS (ref 7.35–7.45)
ABG STANDARD HCO3: 19.4 MEQ/L (ref 22–26)
ABG TOTAL CO2: 18.9 MEQ/L (ref 23–31)
ALBUMIN/GLOBULIN RATIO: 0.7 (ref 1–1.93)
ALBUMIN: 2.3 GM/DL (ref 3.2–5.2)
ALKALINE PHOSPHATASE: 101 U/L (ref 45–117)
ALT SERPL W P-5'-P-CCNC: 80 U/L (ref 12–78)
AMORPH SED URNS QL MICRO: (no result)
ANION GAP: 8 MEQ/L (ref 8–16)
APPEARANCE, URINE: (no result)
AST SERPL-CCNC: 110 U/L (ref 7–37)
BACTERIA UR QL AUTO: (no result)
BASO #: 0 10^3/UL (ref 0–0.2)
BASO %: 0.1 % (ref 0–1)
BILIRUBIN, URINE AUTO: NEGATIVE
BILIRUBIN,TOTAL: 0.4 MG/DL (ref 0.2–1)
BLOOD UREA NITROGEN: 43 MG/DL (ref 7–18)
BLOOD, URINE BLOOD: (no result)
CALCIUM LEVEL: 8.5 MG/DL (ref 8.8–10.2)
CARBON DIOXIDE LEVEL: 22 MEQ/L (ref 21–32)
CHLORIDE LEVEL: 108 MEQ/L (ref 98–107)
CREAT FLD-MCNC: 2.6 MG/DL
CREATININE FOR GFR: 2.53 MG/DL (ref 0.55–1.3)
EOS #: 0 10^3/UL (ref 0–0.5)
EOSINOPHIL NFR BLD AUTO: 0.1 % (ref 0–3)
GFR SERPL CREATININE-BSD FRML MDRD: 19.8 ML/MIN/{1.73_M2} (ref 39–?)
GLUCOSE BLDC GLUCOMTR-MCNC: 104 MG/DL (ref 83–110)
GLUCOSE BLDC GLUCOMTR-MCNC: 110 MG/DL (ref 83–110)
GLUCOSE, FASTING: 95 MG/DL (ref 70–100)
GLUCOSE, URINE (UA) AUTO: NEGATIVE MG/DL
HEMATOCRIT: 32.5 % (ref 36–47)
HEMOGLOBIN: 10.6 G/DL (ref 12–15.5)
IMMATURE GRANULOCYTE %: 0.7 % (ref 0–3)
KETONE, URINE AUTO: NEGATIVE MG/DL
LACTIC ACID SEPSIS PROTOCOL: 1.2 MMOL/L (ref 0.4–2)
LEUKOCYTE ESTERASE UR QL STRIP.AUTO: (no result)
LYMPH #: 1.3 10^3/UL (ref 1.5–4.5)
LYMPH %: 5.5 % (ref 24–44)
MAGNESIUM LEVEL: 1.8 MG/DL (ref 1.8–2.4)
MEAN CORPUSCULAR HEMOGLOBIN: 29 PG (ref 27–33)
MEAN CORPUSCULAR HGB CONC: 32.6 G/DL (ref 32–36.5)
MEAN CORPUSCULAR VOLUME: 89 FL (ref 80–96)
MONO #: 1.3 10^3/UL (ref 0–0.8)
MONO %: 5.8 % (ref 0–5)
NEUTROPHILS #: 20 10^3/UL (ref 1.8–7.7)
NEUTROPHILS %: 87.8 % (ref 36–66)
NITRITE, URINE AUTO: NEGATIVE
NRBC BLD AUTO-RTO: 0 % (ref 0–0)
PH,URINE: 5 UNITS (ref 5–9)
PLATELET COUNT, AUTOMATED: 264 10^3/UL (ref 150–450)
POTASSIUM SERUM: 4.2 MEQ/L (ref 3.5–5.1)
PROT UR QL STRIP.AUTO: NEGATIVE MG/DL
RBC, URINE AUTO: 14 /HPF (ref 0–3)
RED BLOOD COUNT: 3.65 10^6/UL (ref 4–5.4)
RED CELL DISTRIBUTION WIDTH: 13.9 % (ref 11.5–14.5)
SODIUM LEVEL: 138 MEQ/L (ref 136–145)
SPECIFIC GRAVITY URINE AUTO: 1 (ref 1–1.03)
SPECIMEN SOURCE FLD: (no result)
SQUAMOUS #/AREA URNS AUTO: 0 /HPF (ref 0–6)
TOTAL PROTEIN: 5.6 GM/DL (ref 6.4–8.2)
UROBILINOGEN, URINE AUTO: 0.2 MG/DL (ref 0–2)
WBC, URINE AUTO: 11 /HPF (ref 0–3)
WHITE BLOOD COUNT: 22.7 10^3/UL (ref 4–10)

## 2018-05-10 RX ADMIN — DEXTROSE MONOHYDRATE 1 MLS/HR: 50 INJECTION, SOLUTION INTRAVENOUS at 13:02

## 2018-05-10 RX ADMIN — DEXTROSE MONOHYDRATE 1 MG: 50 INJECTION, SOLUTION INTRAVENOUS at 11:56

## 2018-05-10 RX ADMIN — MEROPENEM AND SODIUM CHLORIDE 1 MLS/HR: 500 INJECTION, SOLUTION INTRAVENOUS at 11:56

## 2018-05-10 RX ADMIN — SODIUM CHLORIDE 1 ML: 9 INJECTION, SOLUTION INTRAVENOUS at 11:56

## 2018-05-10 RX ADMIN — ACETAMINOPHEN 1 MG: 650 TABLET, FILM COATED, EXTENDED RELEASE ORAL at 05:33

## 2018-05-10 RX ADMIN — ACETAMINOPHEN 1 MG: 650 TABLET, FILM COATED, EXTENDED RELEASE ORAL at 14:13

## 2018-05-10 RX ADMIN — CIPROFLOXACIN 1 MG: 250 TABLET, FILM COATED ORAL at 05:33

## 2018-05-10 RX ADMIN — Medication 1: at 21:00

## 2018-05-10 RX ADMIN — ACETAMINOPHEN 1 MG: 650 TABLET, FILM COATED, EXTENDED RELEASE ORAL at 21:30

## 2018-05-10 RX ADMIN — SODIUM CHLORIDE 1 MLS/HR: 9 INJECTION, SOLUTION INTRAVENOUS at 10:30

## 2018-05-10 RX ADMIN — NITROGLYCERIN 1 PATCH: 0.2 PATCH TRANSDERMAL at 10:20

## 2018-05-10 RX ADMIN — SODIUM CHLORIDE 1 MLS/HR: 9 INJECTION, SOLUTION INTRAVENOUS at 14:00

## 2018-05-11 LAB
ALBUMIN/GLOBULIN RATIO: 0.63 (ref 1–1.93)
ALBUMIN: 1.9 GM/DL (ref 3.2–5.2)
ALKALINE PHOSPHATASE: 91 U/L (ref 45–117)
ALT SERPL W P-5'-P-CCNC: 32 U/L (ref 12–78)
ANION GAP: 10 MEQ/L (ref 8–16)
ANION GAP: 9 MEQ/L (ref 8–16)
AST SERPL-CCNC: 34 U/L (ref 7–37)
BASO #: 0 10^3/UL (ref 0–0.2)
BASO %: 0.1 % (ref 0–1)
BILIRUBIN,TOTAL: 0.3 MG/DL (ref 0.2–1)
BLOOD UREA NITROGEN: 40 MG/DL (ref 7–18)
BLOOD UREA NITROGEN: 41 MG/DL (ref 7–18)
CALCIUM LEVEL: 7.8 MG/DL (ref 8.8–10.2)
CALCIUM LEVEL: 8.2 MG/DL (ref 8.8–10.2)
CARBON DIOXIDE LEVEL: 18 MEQ/L (ref 21–32)
CARBON DIOXIDE LEVEL: 20 MEQ/L (ref 21–32)
CHLORIDE LEVEL: 113 MEQ/L (ref 98–107)
CHLORIDE LEVEL: 113 MEQ/L (ref 98–107)
CREATININE FOR GFR: 2.42 MG/DL (ref 0.55–1.3)
CREATININE FOR GFR: 2.57 MG/DL (ref 0.55–1.3)
EOS #: 0 10^3/UL (ref 0–0.5)
EOSINOPHIL NFR BLD AUTO: 0.2 % (ref 0–3)
GFR SERPL CREATININE-BSD FRML MDRD: 19.4 ML/MIN/{1.73_M2} (ref 39–?)
GFR SERPL CREATININE-BSD FRML MDRD: 20.8 ML/MIN/{1.73_M2} (ref 39–?)
GLUCOSE BLDC GLUCOMTR-MCNC: 105 MG/DL (ref 83–110)
GLUCOSE BLDC GLUCOMTR-MCNC: 109 MG/DL (ref 83–110)
GLUCOSE BLDC GLUCOMTR-MCNC: 119 MG/DL (ref 83–110)
GLUCOSE BLDC GLUCOMTR-MCNC: 78 MG/DL (ref 83–110)
GLUCOSE BLDC GLUCOMTR-MCNC: 95 MG/DL (ref 83–110)
GLUCOSE, FASTING: 103 MG/DL (ref 70–100)
GLUCOSE, FASTING: 110 MG/DL (ref 70–100)
HEMATOCRIT: 26.1 % (ref 36–47)
HEMOGLOBIN: 8.5 G/DL (ref 12–15.5)
IMMATURE GRANULOCYTE %: 0.4 % (ref 0–3)
LYMPH #: 0.9 10^3/UL (ref 1.5–4.5)
LYMPH %: 3.6 % (ref 24–44)
MAGNESIUM LEVEL: 1.6 MG/DL (ref 1.8–2.4)
MAGNESIUM LEVEL: 2.1 MG/DL (ref 1.8–2.4)
MEAN CORPUSCULAR HEMOGLOBIN: 29.3 PG (ref 27–33)
MEAN CORPUSCULAR HGB CONC: 32.6 G/DL (ref 32–36.5)
MEAN CORPUSCULAR VOLUME: 90 FL (ref 80–96)
MONO #: 1.2 10^3/UL (ref 0–0.8)
MONO %: 4.9 % (ref 0–5)
NEUTROPHILS #: 21.4 10^3/UL (ref 1.8–7.7)
NEUTROPHILS %: 90.8 % (ref 36–66)
NRBC BLD AUTO-RTO: 0 % (ref 0–0)
PLATELET COUNT, AUTOMATED: 211 10^3/UL (ref 150–450)
POTASSIUM SERUM: 4.2 MEQ/L (ref 3.5–5.1)
POTASSIUM SERUM: 4.5 MEQ/L (ref 3.5–5.1)
RED BLOOD COUNT: 2.9 10^6/UL (ref 4–5.4)
RED CELL DISTRIBUTION WIDTH: 14.1 % (ref 11.5–14.5)
SODIUM LEVEL: 140 MEQ/L (ref 136–145)
SODIUM LEVEL: 143 MEQ/L (ref 136–145)
TOTAL PROTEIN: 4.9 GM/DL (ref 6.4–8.2)
VANCOMYCIN RANDOM: 13.1 UG/ML
WHITE BLOOD COUNT: 23.6 10^3/UL (ref 4–10)

## 2018-05-11 RX ADMIN — ACETAMINOPHEN 1 MG: 650 TABLET, FILM COATED, EXTENDED RELEASE ORAL at 05:34

## 2018-05-11 RX ADMIN — ACETAMINOPHEN 1 MG: 650 TABLET, FILM COATED, EXTENDED RELEASE ORAL at 21:06

## 2018-05-11 RX ADMIN — DEXTROSE MONOHYDRATE 1 MG: 50 INJECTION, SOLUTION INTRAVENOUS at 11:43

## 2018-05-11 RX ADMIN — SODIUM CHLORIDE 1 MLS/HR: 9 INJECTION, SOLUTION INTRAVENOUS at 05:34

## 2018-05-11 RX ADMIN — METOPROLOL TARTRATE 1 MG: 5 INJECTION INTRAVENOUS at 18:58

## 2018-05-11 RX ADMIN — SODIUM CHLORIDE 1 MLS/HR: 9 INJECTION, SOLUTION INTRAVENOUS at 22:21

## 2018-05-11 RX ADMIN — MEROPENEM AND SODIUM CHLORIDE 1 MLS/HR: 500 INJECTION, SOLUTION INTRAVENOUS at 23:37

## 2018-05-11 RX ADMIN — MEROPENEM AND SODIUM CHLORIDE 1 MLS/HR: 500 INJECTION, SOLUTION INTRAVENOUS at 11:43

## 2018-05-11 RX ADMIN — MAGNESIUM SULFATE IN DEXTROSE 1 MLS/HR: 10 INJECTION, SOLUTION INTRAVENOUS at 07:51

## 2018-05-11 RX ADMIN — METOPROLOL TARTRATE 1 MG: 5 INJECTION INTRAVENOUS at 19:10

## 2018-05-11 RX ADMIN — Medication 1: at 21:00

## 2018-05-11 RX ADMIN — SODIUM CHLORIDE 1 MLS/HR: 9 INJECTION, SOLUTION INTRAVENOUS at 06:00

## 2018-05-11 RX ADMIN — LABETALOL HYDROCHLORIDE 1 MG: 100 TABLET, FILM COATED ORAL at 18:15

## 2018-05-11 RX ADMIN — MEROPENEM AND SODIUM CHLORIDE 1 MLS/HR: 500 INJECTION, SOLUTION INTRAVENOUS at 01:04

## 2018-05-11 RX ADMIN — ACETAMINOPHEN 1 MG: 650 TABLET, FILM COATED, EXTENDED RELEASE ORAL at 14:52

## 2018-05-12 LAB
ALBUMIN/GLOBULIN RATIO: 0.5 (ref 1–1.93)
ALBUMIN: 1.6 GM/DL (ref 3.2–5.2)
ALKALINE PHOSPHATASE: 81 U/L (ref 45–117)
ALT SERPL W P-5'-P-CCNC: 18 U/L (ref 12–78)
ANION GAP: 8 MEQ/L (ref 8–16)
AST SERPL-CCNC: 19 U/L (ref 7–37)
BASO #: 0 10^3/UL (ref 0–0.2)
BASO %: 0.1 % (ref 0–1)
BILIRUBIN,TOTAL: 0.2 MG/DL (ref 0.2–1)
BLOOD UREA NITROGEN: 40 MG/DL (ref 7–18)
CALCIUM LEVEL: 7.8 MG/DL (ref 8.8–10.2)
CARBON DIOXIDE LEVEL: 20 MEQ/L (ref 21–32)
CHLORIDE LEVEL: 115 MEQ/L (ref 98–107)
CREATININE FOR GFR: 2.12 MG/DL (ref 0.55–1.3)
EOS #: 0.3 10^3/UL (ref 0–0.5)
EOSINOPHIL NFR BLD AUTO: 1.8 % (ref 0–3)
GFR SERPL CREATININE-BSD FRML MDRD: 24.2 ML/MIN/{1.73_M2} (ref 39–?)
GLUCOSE BLDC GLUCOMTR-MCNC: 83 MG/DL (ref 83–110)
GLUCOSE BLDC GLUCOMTR-MCNC: 84 MG/DL (ref 83–110)
GLUCOSE BLDC GLUCOMTR-MCNC: 98 MG/DL (ref 83–110)
GLUCOSE, FASTING: 88 MG/DL (ref 70–100)
HEMATOCRIT: 25.3 % (ref 36–47)
HEMOGLOBIN: 8 G/DL (ref 12–15.5)
IMMATURE GRANULOCYTE %: 0.4 % (ref 0–3)
LYMPH #: 1.2 10^3/UL (ref 1.5–4.5)
LYMPH %: 8.3 % (ref 24–44)
MAGNESIUM LEVEL: 2 MG/DL (ref 1.8–2.4)
MEAN CORPUSCULAR HEMOGLOBIN: 28.9 PG (ref 27–33)
MEAN CORPUSCULAR HGB CONC: 31.6 G/DL (ref 32–36.5)
MEAN CORPUSCULAR VOLUME: 91.3 FL (ref 80–96)
MONO #: 0.9 10^3/UL (ref 0–0.8)
MONO %: 6.3 % (ref 0–5)
NEUTROPHILS #: 12 10^3/UL (ref 1.8–7.7)
NEUTROPHILS %: 83.1 % (ref 36–66)
NRBC BLD AUTO-RTO: 0 % (ref 0–0)
PLATELET COUNT, AUTOMATED: 254 10^3/UL (ref 150–450)
POTASSIUM SERUM: 4.3 MEQ/L (ref 3.5–5.1)
RED BLOOD COUNT: 2.77 10^6/UL (ref 4–5.4)
RED CELL DISTRIBUTION WIDTH: 14.1 % (ref 11.5–14.5)
SODIUM LEVEL: 143 MEQ/L (ref 136–145)
TOTAL PROTEIN: 4.8 GM/DL (ref 6.4–8.2)
VANCOMYCIN RANDOM: 13.2 UG/ML
WHITE BLOOD COUNT: 14.4 10^3/UL (ref 4–10)

## 2018-05-12 RX ADMIN — ACETAMINOPHEN 1 MG: 650 TABLET, FILM COATED, EXTENDED RELEASE ORAL at 14:18

## 2018-05-12 RX ADMIN — LABETALOL HYDROCHLORIDE 1 MG: 100 TABLET, FILM COATED ORAL at 18:14

## 2018-05-12 RX ADMIN — DEXTROSE MONOHYDRATE 1 MG: 50 INJECTION, SOLUTION INTRAVENOUS at 12:44

## 2018-05-12 RX ADMIN — Medication 1: at 20:11

## 2018-05-12 RX ADMIN — ACETAMINOPHEN 1 MG: 650 TABLET, FILM COATED, EXTENDED RELEASE ORAL at 05:50

## 2018-05-12 RX ADMIN — ACETAMINOPHEN 1 MG: 650 TABLET, FILM COATED, EXTENDED RELEASE ORAL at 20:14

## 2018-05-12 RX ADMIN — DEXTROSE MONOHYDRATE 1 MLS/HR: 50 INJECTION, SOLUTION INTRAVENOUS at 05:17

## 2018-05-12 RX ADMIN — LABETALOL HYDROCHLORIDE 1 MG: 100 TABLET, FILM COATED ORAL at 05:51

## 2018-05-12 RX ADMIN — MEROPENEM AND SODIUM CHLORIDE 1 MLS/HR: 500 INJECTION, SOLUTION INTRAVENOUS at 12:44

## 2018-05-13 LAB
ALBUMIN/GLOBULIN RATIO: 0.53 (ref 1–1.93)
ALBUMIN: 1.8 GM/DL (ref 3.2–5.2)
ALKALINE PHOSPHATASE: 83 U/L (ref 45–117)
ALT SERPL W P-5'-P-CCNC: 11 U/L (ref 12–78)
ANION GAP: 7 MEQ/L (ref 8–16)
AST SERPL-CCNC: 16 U/L (ref 7–37)
BASO #: 0 10^3/UL (ref 0–0.2)
BASO %: 0.3 % (ref 0–1)
BILIRUBIN,TOTAL: 0.3 MG/DL (ref 0.2–1)
BLOOD UREA NITROGEN: 38 MG/DL (ref 7–18)
CALCIUM LEVEL: 8.1 MG/DL (ref 8.8–10.2)
CARBON DIOXIDE LEVEL: 21 MEQ/L (ref 21–32)
CHLORIDE LEVEL: 117 MEQ/L (ref 98–107)
CREATININE FOR GFR: 1.74 MG/DL (ref 0.55–1.3)
EOS #: 0.4 10^3/UL (ref 0–0.5)
EOSINOPHIL NFR BLD AUTO: 3 % (ref 0–3)
GFR SERPL CREATININE-BSD FRML MDRD: 30.4 ML/MIN/{1.73_M2} (ref 39–?)
GLUCOSE BLDC GLUCOMTR-MCNC: 131 MG/DL (ref 83–110)
GLUCOSE BLDC GLUCOMTR-MCNC: 96 MG/DL (ref 83–110)
GLUCOSE, FASTING: 82 MG/DL (ref 70–100)
HEMATOCRIT: 26.8 % (ref 36–47)
HEMOGLOBIN: 8.7 G/DL (ref 12–15.5)
IMMATURE GRANULOCYTE %: 0.5 % (ref 0–3)
LYMPH #: 1.6 10^3/UL (ref 1.5–4.5)
LYMPH %: 13.4 % (ref 24–44)
MAGNESIUM LEVEL: 1.9 MG/DL (ref 1.8–2.4)
MEAN CORPUSCULAR HEMOGLOBIN: 29 PG (ref 27–33)
MEAN CORPUSCULAR HGB CONC: 32.5 G/DL (ref 32–36.5)
MEAN CORPUSCULAR VOLUME: 89.3 FL (ref 80–96)
MONO #: 0.9 10^3/UL (ref 0–0.8)
MONO %: 7.6 % (ref 0–5)
NEUTROPHILS #: 8.8 10^3/UL (ref 1.8–7.7)
NEUTROPHILS %: 75.2 % (ref 36–66)
NRBC BLD AUTO-RTO: 0 % (ref 0–0)
PLATELET COUNT, AUTOMATED: 330 10^3/UL (ref 150–450)
POTASSIUM SERUM: 4.2 MEQ/L (ref 3.5–5.1)
RED BLOOD COUNT: 3 10^6/UL (ref 4–5.4)
RED CELL DISTRIBUTION WIDTH: 14.3 % (ref 11.5–14.5)
SODIUM LEVEL: 145 MEQ/L (ref 136–145)
TOTAL PROTEIN: 5.2 GM/DL (ref 6.4–8.2)
VANCOMYCIN RANDOM: 18.3 UG/ML
WHITE BLOOD COUNT: 11.7 10^3/UL (ref 4–10)

## 2018-05-13 RX ADMIN — CIPROFLOXACIN 1 MG: 250 TABLET, FILM COATED ORAL at 11:44

## 2018-05-13 RX ADMIN — LABETALOL HYDROCHLORIDE 1 MG: 100 TABLET, FILM COATED ORAL at 17:27

## 2018-05-13 RX ADMIN — ACETAMINOPHEN, ASPIRIN, CAFFEINE 1 EA: 250; 65; 250 TABLET, FILM COATED ORAL at 13:11

## 2018-05-13 RX ADMIN — MEROPENEM AND SODIUM CHLORIDE 1 MLS/HR: 500 INJECTION, SOLUTION INTRAVENOUS at 23:47

## 2018-05-13 RX ADMIN — MEROPENEM AND SODIUM CHLORIDE 1 MLS/HR: 500 INJECTION, SOLUTION INTRAVENOUS at 11:42

## 2018-05-13 RX ADMIN — DEXTROSE MONOHYDRATE 1 MLS/HR: 50 INJECTION, SOLUTION INTRAVENOUS at 09:18

## 2018-05-13 RX ADMIN — DEXTROSE MONOHYDRATE 1 MG: 50 INJECTION, SOLUTION INTRAVENOUS at 11:43

## 2018-05-13 RX ADMIN — MEROPENEM AND SODIUM CHLORIDE 1 MLS/HR: 500 INJECTION, SOLUTION INTRAVENOUS at 00:16

## 2018-05-13 RX ADMIN — NIFEDIPINE 1 MG: 60 TABLET, FILM COATED, EXTENDED RELEASE ORAL at 03:11

## 2018-05-13 RX ADMIN — LABETALOL HYDROCHLORIDE 1 MG: 100 TABLET, FILM COATED ORAL at 05:51

## 2018-05-13 RX ADMIN — DOCUSATE SODIUM 1 MG: 100 CAPSULE, LIQUID FILLED ORAL at 09:51

## 2018-05-13 RX ADMIN — ACETAMINOPHEN 1 MG: 650 TABLET, FILM COATED, EXTENDED RELEASE ORAL at 05:50

## 2018-05-13 RX ADMIN — CIPROFLOXACIN 1 MG: 250 TABLET, FILM COATED ORAL at 17:26

## 2018-05-14 LAB
ALBUMIN/GLOBULIN RATIO: 0.57 (ref 1–1.93)
ALBUMIN: 2 GM/DL (ref 3.2–5.2)
ALKALINE PHOSPHATASE: 83 U/L (ref 45–117)
ALT SERPL W P-5'-P-CCNC: 11 U/L (ref 12–78)
ANION GAP: 8 MEQ/L (ref 8–16)
AST SERPL-CCNC: 14 U/L (ref 7–37)
BASO #: 0 10^3/UL (ref 0–0.2)
BASO %: 0.2 % (ref 0–1)
BILIRUBIN,TOTAL: 0.3 MG/DL (ref 0.2–1)
BLOOD UREA NITROGEN: 36 MG/DL (ref 7–18)
CALCIUM LEVEL: 8 MG/DL (ref 8.8–10.2)
CARBON DIOXIDE LEVEL: 21 MEQ/L (ref 21–32)
CHLORIDE LEVEL: 115 MEQ/L (ref 98–107)
CREATININE FOR GFR: 1.71 MG/DL (ref 0.55–1.3)
EOS #: 0.3 10^3/UL (ref 0–0.5)
EOSINOPHIL NFR BLD AUTO: 3.1 % (ref 0–3)
GFR SERPL CREATININE-BSD FRML MDRD: 31.1 ML/MIN/{1.73_M2} (ref 39–?)
GLUCOSE, FASTING: 80 MG/DL (ref 70–100)
HEMATOCRIT: 27.5 % (ref 36–47)
HEMOGLOBIN: 9 G/DL (ref 12–15.5)
IMMATURE GRANULOCYTE %: 0.5 % (ref 0–3)
LYMPH #: 1.8 10^3/UL (ref 1.5–4.5)
LYMPH %: 16.5 % (ref 24–44)
MAGNESIUM LEVEL: 1.7 MG/DL (ref 1.8–2.4)
MEAN CORPUSCULAR HEMOGLOBIN: 29.3 PG (ref 27–33)
MEAN CORPUSCULAR HGB CONC: 32.7 G/DL (ref 32–36.5)
MEAN CORPUSCULAR VOLUME: 89.6 FL (ref 80–96)
MONO #: 0.9 10^3/UL (ref 0–0.8)
MONO %: 8.1 % (ref 0–5)
NEUTROPHILS #: 7.7 10^3/UL (ref 1.8–7.7)
NEUTROPHILS %: 71.6 % (ref 36–66)
NRBC BLD AUTO-RTO: 0 % (ref 0–0)
PLATELET COUNT, AUTOMATED: 347 10^3/UL (ref 150–450)
POTASSIUM SERUM: 4.1 MEQ/L (ref 3.5–5.1)
RED BLOOD COUNT: 3.07 10^6/UL (ref 4–5.4)
RED CELL DISTRIBUTION WIDTH: 14.1 % (ref 11.5–14.5)
SODIUM LEVEL: 144 MEQ/L (ref 136–145)
TOTAL PROTEIN: 5.5 GM/DL (ref 6.4–8.2)
WHITE BLOOD COUNT: 10.7 10^3/UL (ref 4–10)

## 2018-05-14 RX ADMIN — LABETALOL HYDROCHLORIDE 1 MG: 100 TABLET, FILM COATED ORAL at 04:58

## 2018-05-14 RX ADMIN — DEXTROSE MONOHYDRATE 1 MG: 50 INJECTION, SOLUTION INTRAVENOUS at 11:52

## 2018-05-14 RX ADMIN — NIFEDIPINE 1 MG: 60 TABLET, FILM COATED, EXTENDED RELEASE ORAL at 09:20

## 2018-05-14 RX ADMIN — CIPROFLOXACIN 1 MG: 250 TABLET, FILM COATED ORAL at 04:58

## 2018-05-14 RX ADMIN — MAGNESIUM SULFATE IN DEXTROSE 1 MLS/HR: 10 INJECTION, SOLUTION INTRAVENOUS at 06:51

## 2018-05-31 ENCOUNTER — HOSPITAL ENCOUNTER (OUTPATIENT)
Dept: HOSPITAL 53 - M SMT | Age: 73
End: 2018-05-31
Attending: NURSE PRACTITIONER
Payer: MEDICARE

## 2018-05-31 DIAGNOSIS — N13.1: Primary | ICD-10-CM

## 2018-05-31 LAB
AMORPH SED URNS QL MICRO: (no result)
APPEARANCE, URINE: (no result)
BACTERIA UR QL AUTO: (no result)
BILIRUBIN, URINE AUTO: NEGATIVE
BLOOD, URINE BLOOD: (no result)
GLUCOSE, URINE (UA) AUTO: NEGATIVE MG/DL
KETONE, URINE AUTO: NEGATIVE MG/DL
LEUKOCYTE ESTERASE UR QL STRIP.AUTO: (no result)
MUCUS, URINE: (no result)
NITRITE, URINE AUTO: NEGATIVE
PH,URINE: 5 UNITS (ref 5–9)
PROT UR QL STRIP.AUTO: (no result) MG/DL
RBC, URINE AUTO: (no result) /HPF (ref 0–3)
SPECIFIC GRAVITY URINE AUTO: 1.02 (ref 1–1.03)
SQUAMOUS #/AREA URNS AUTO: 0 /HPF (ref 0–6)
UROBILINOGEN, URINE AUTO: 0.2 MG/DL (ref 0–2)
WBC, URINE AUTO: 27 /HPF (ref 0–3)

## 2018-05-31 PROCEDURE — 81001 URINALYSIS AUTO W/SCOPE: CPT

## 2018-09-10 ENCOUNTER — HOSPITAL ENCOUNTER (OUTPATIENT)
Dept: HOSPITAL 53 - M SMT | Age: 73
End: 2018-09-10
Attending: UROLOGY
Payer: MEDICARE

## 2018-09-10 DIAGNOSIS — N13.1: Primary | ICD-10-CM

## 2018-09-10 LAB
APPEARANCE, URINE: (no result)
BACTERIA UR QL AUTO: NEGATIVE
BILIRUBIN, URINE AUTO: NEGATIVE
BLOOD, URINE BLOOD: NEGATIVE
CAOX CRY URNS QL MICRO: (no result)
GLUCOSE, URINE (UA) AUTO: NEGATIVE MG/DL
KETONE, URINE AUTO: NEGATIVE MG/DL
LEUKOCYTE ESTERASE UR QL STRIP.AUTO: NEGATIVE
MUCUS, URINE: (no result)
NITRITE, URINE AUTO: NEGATIVE
PH,URINE: 6 UNITS (ref 5–9)
PROT UR QL STRIP.AUTO: (no result) MG/DL
RBC, URINE AUTO: 2 /HPF (ref 0–3)
SPECIFIC GRAVITY URINE AUTO: 1.02 (ref 1–1.03)
SQUAMOUS #/AREA URNS AUTO: 0 /HPF (ref 0–6)
UROBILINOGEN, URINE AUTO: 0.2 MG/DL (ref 0–2)
WBC, URINE AUTO: 1 /HPF (ref 0–3)

## 2018-09-10 PROCEDURE — 81001 URINALYSIS AUTO W/SCOPE: CPT

## 2018-10-18 ENCOUNTER — HOSPITAL ENCOUNTER (OUTPATIENT)
Dept: HOSPITAL 53 - M CARPUL | Age: 73
End: 2018-10-18
Attending: NURSE PRACTITIONER
Payer: MEDICARE

## 2018-10-18 DIAGNOSIS — I34.0: ICD-10-CM

## 2018-10-18 DIAGNOSIS — I42.2: ICD-10-CM

## 2018-10-18 DIAGNOSIS — I35.8: Primary | ICD-10-CM

## 2018-10-18 DIAGNOSIS — M34.9: ICD-10-CM

## 2018-10-18 DIAGNOSIS — I34.8: ICD-10-CM

## 2018-10-18 PROCEDURE — 93306 TTE W/DOPPLER COMPLETE: CPT

## 2018-11-21 ENCOUNTER — HOSPITAL ENCOUNTER (EMERGENCY)
Dept: HOSPITAL 53 - M ED | Age: 73
End: 2018-11-21
Payer: MEDICARE

## 2018-11-21 DIAGNOSIS — F41.9: ICD-10-CM

## 2018-11-21 DIAGNOSIS — R06.03: ICD-10-CM

## 2018-11-21 DIAGNOSIS — M34.9: ICD-10-CM

## 2018-11-21 DIAGNOSIS — J18.9: ICD-10-CM

## 2018-11-21 DIAGNOSIS — J44.9: ICD-10-CM

## 2018-11-21 DIAGNOSIS — Z87.891: ICD-10-CM

## 2018-11-21 DIAGNOSIS — I46.9: Primary | ICD-10-CM

## 2018-11-21 DIAGNOSIS — I44.7: ICD-10-CM

## 2018-11-21 DIAGNOSIS — G43.909: ICD-10-CM

## 2018-11-21 DIAGNOSIS — Z79.899: ICD-10-CM

## 2018-11-21 DIAGNOSIS — E78.5: ICD-10-CM

## 2018-11-21 DIAGNOSIS — R00.0: ICD-10-CM

## 2018-11-21 LAB
ABG BASE EXCESS: -10.3 (ref -2–2)
ABG HCO3: 15 MEQ/L (ref 22–26)
ABG O2 SATURATION: 98.2 % (ref 95–99)
ABG PARTIAL PRESSURE CO2: 31 MMHG (ref 35–45)
ABG PARTIAL PRESSURE O2: 129.1 MMHG (ref 75–100)
ABG PH (ARTERIAL): 7.3 UNITS (ref 7.35–7.45)
ABG STANDARD HCO3: 16.2 MEQ/L (ref 22–26)
ABG TOTAL CO2: 15.9 MEQ/L (ref 23–31)
ALBUMIN/GLOBULIN RATIO: 1.14 (ref 1–1.93)
ALBUMIN: 3.2 GM/DL (ref 3.2–5.2)
ALKALINE PHOSPHATASE: 126 U/L (ref 45–117)
ALT SERPL W P-5'-P-CCNC: 152 U/L (ref 12–78)
ANION GAP: 14 MEQ/L (ref 8–16)
AST SERPL-CCNC: 188 U/L (ref 7–37)
BASO #: 0.1 10^3/UL (ref 0–0.2)
BASO %: 0.2 % (ref 0–1)
BILIRUB CONJ SERPL-MCNC: < 0.1 MG/DL (ref 0–0.2)
BILIRUBIN,TOTAL: 0.3 MG/DL (ref 0.2–1)
BLOOD UREA NITROGEN: 37 MG/DL (ref 7–18)
CALCIUM LEVEL: 8.6 MG/DL (ref 8.8–10.2)
CARBON DIOXIDE LEVEL: 18 MEQ/L (ref 21–32)
CHLORIDE LEVEL: 108 MEQ/L (ref 98–107)
CK MB CFR.DF SERPL CALC: 6.55
CK SERPL-CCNC: 58 U/L (ref 26–192)
CK-MB VALUE MASS: 4 NG/ML (ref ?–3.6)
CREATININE FOR GFR: 2.05 MG/DL (ref 0.55–1.3)
EOS #: 0.2 10^3/UL (ref 0–0.5)
EOSINOPHIL NFR BLD AUTO: 0.6 % (ref 0–3)
FREE T4: 0.96 NG/DL (ref 0.76–1.46)
GFR SERPL CREATININE-BSD FRML MDRD: 25.2 ML/MIN/{1.73_M2} (ref 39–?)
GLUCOSE, FASTING: 238 MG/DL (ref 70–100)
HEMATOCRIT: 34 % (ref 36–47)
HEMOGLOBIN: 10.3 G/DL (ref 12–15.5)
IMMATURE GRANULOCYTE %: 1 % (ref 0–3)
INR: 1.21
LIPASE: 187 U/L (ref 73–393)
LYMPH #: 3.4 10^3/UL (ref 1.5–4.5)
LYMPH %: 12.3 % (ref 24–44)
MEAN CORPUSCULAR HEMOGLOBIN: 29 PG (ref 27–33)
MEAN CORPUSCULAR HGB CONC: 30.3 G/DL (ref 32–36.5)
MEAN CORPUSCULAR VOLUME: 95.8 FL (ref 80–96)
MONO #: 2 10^3/UL (ref 0–0.8)
MONO %: 7.3 % (ref 0–5)
NEUTROPHILS #: 21.6 10^3/UL (ref 1.8–7.7)
NEUTROPHILS %: 78.6 % (ref 36–66)
NRBC BLD AUTO-RTO: 0 % (ref 0–0)
NT-PRO BNP: (no result) PG/ML (ref ?–125)
PARTIAL THROMBOPLASTIN TIME: 27.3 SECONDS (ref 25.4–37.6)
PLATELET COUNT, AUTOMATED: 424 10^3/UL (ref 150–450)
POTASSIUM SERUM: 5 MEQ/L (ref 3.5–5.1)
PROTHROMBIN TIME: 15.5 SECONDS (ref 12.1–14.4)
RED BLOOD COUNT: 3.55 10^6/UL (ref 4–5.4)
RED CELL DISTRIBUTION WIDTH: 14.1 % (ref 11.5–14.5)
SODIUM LEVEL: 140 MEQ/L (ref 136–145)
THYROID STIMULATING HORMONE: 4.54 UIU/ML (ref 0.36–3.74)
TOTAL PROTEIN: 6 GM/DL (ref 6.4–8.2)
TROPONIN I: 0.2 NG/ML (ref ?–0.1)
WHITE BLOOD COUNT: 27.5 10^3/UL (ref 4–10)

## 2018-11-21 RX ADMIN — FUROSEMIDE 1 MG: 10 INJECTION, SOLUTION INTRAMUSCULAR; INTRAVENOUS at 04:50

## 2018-11-21 RX ADMIN — ONDANSETRON 1 MG: 2 INJECTION INTRAMUSCULAR; INTRAVENOUS at 04:22

## 2018-11-21 RX ADMIN — DEXTROSE MONOHYDRATE 1 MLS/HR: 50 INJECTION, SOLUTION INTRAVENOUS at 05:01

## 2018-11-21 RX ADMIN — DEXTROSE MONOHYDRATE 1 MG: 50 INJECTION, SOLUTION INTRAVENOUS at 05:45

## 2018-11-21 RX ADMIN — DEXTROSE MONOHYDRATE 1 MG: 50 INJECTION, SOLUTION INTRAVENOUS at 05:48

## 2018-11-21 RX ADMIN — DEXTROSE MONOHYDRATE 1 MG: 50 INJECTION, SOLUTION INTRAVENOUS at 05:54

## 2018-11-21 RX ADMIN — CEFTRIAXONE 1 MLS/HR: 1 INJECTION, POWDER, FOR SOLUTION INTRAMUSCULAR; INTRAVENOUS at 04:50

## 2018-11-21 RX ADMIN — IPRATROPIUM BROMIDE AND ALBUTEROL SULFATE 1 ML: .5; 3 SOLUTION RESPIRATORY (INHALATION) at 03:59

## 2018-11-21 RX ADMIN — DEXTROSE MONOHYDRATE 1 MEQ: 50 INJECTION, SOLUTION INTRAVENOUS at 05:50

## 2018-11-21 RX ADMIN — IPRATROPIUM BROMIDE AND ALBUTEROL SULFATE 1 ML: .5; 3 SOLUTION RESPIRATORY (INHALATION) at 04:29

## 2018-11-21 RX ADMIN — LABETALOL HYDROCHLORIDE 1 MG: 5 INJECTION INTRAVENOUS at 04:08

## 2018-11-21 RX ADMIN — DEXTROSE MONOHYDRATE 1 MG: 50 INJECTION, SOLUTION INTRAVENOUS at 05:51
